# Patient Record
Sex: FEMALE | Race: BLACK OR AFRICAN AMERICAN | NOT HISPANIC OR LATINO | Employment: FULL TIME | URBAN - METROPOLITAN AREA
[De-identification: names, ages, dates, MRNs, and addresses within clinical notes are randomized per-mention and may not be internally consistent; named-entity substitution may affect disease eponyms.]

---

## 2020-02-07 ENCOUNTER — ROUTINE PRENATAL (OUTPATIENT)
Dept: OBSTETRICS AND GYNECOLOGY | Facility: CLINIC | Age: 25
End: 2020-02-07
Payer: COMMERCIAL

## 2020-02-07 VITALS — SYSTOLIC BLOOD PRESSURE: 128 MMHG | HEART RATE: 89 BPM | WEIGHT: 252.63 LBS | DIASTOLIC BLOOD PRESSURE: 72 MMHG

## 2020-02-07 DIAGNOSIS — Z34.01 ENCOUNTER FOR SUPERVISION OF NORMAL FIRST PREGNANCY IN FIRST TRIMESTER: Primary | ICD-10-CM

## 2020-02-07 DIAGNOSIS — O99.211 OBESITY AFFECTING PREGNANCY IN FIRST TRIMESTER: ICD-10-CM

## 2020-02-07 LAB
AMPHETAMINES (500): NEGATIVE NG/ML
BARBITURATES (200): NEGATIVE NG/ML
BENZODIAZEPINES: NEGATIVE NG/ML
COCAINE (150): NEGATIVE NG/ML
MARIJUANA, THC (50): NEGATIVE NG/ML
METHADONE: NEGATIVE NG/ML
OPIATES: NEGATIVE NG/ML
OXYCODONE: NEGATIVE NG/ML
PH: 6.9 (ref 4.5–8)
PHENCYCLIDINE (25): NEGATIVE NG/ML
URINE CREATININE D/S: 152.2 MG/DL

## 2020-02-07 PROCEDURE — 0500F PR INITIAL PRENATAL CARE VISIT: ICD-10-PCS | Mod: S$GLB,,, | Performed by: OBSTETRICS & GYNECOLOGY

## 2020-02-07 PROCEDURE — 90686 FLU VACCINE (QUAD) GREATER THAN OR EQUAL TO 3YO PRESERVATIVE FREE IM: ICD-10-PCS | Mod: S$GLB,,, | Performed by: OBSTETRICS & GYNECOLOGY

## 2020-02-07 PROCEDURE — 90686 IIV4 VACC NO PRSV 0.5 ML IM: CPT | Mod: S$GLB,,, | Performed by: OBSTETRICS & GYNECOLOGY

## 2020-02-07 PROCEDURE — 0500F INITIAL PRENATAL CARE VISIT: CPT | Mod: S$GLB,,, | Performed by: OBSTETRICS & GYNECOLOGY

## 2020-02-07 PROCEDURE — 90471 FLU VACCINE (QUAD) GREATER THAN OR EQUAL TO 3YO PRESERVATIVE FREE IM: ICD-10-PCS | Mod: S$GLB,,, | Performed by: OBSTETRICS & GYNECOLOGY

## 2020-02-07 PROCEDURE — 90471 IMMUNIZATION ADMIN: CPT | Mod: S$GLB,,, | Performed by: OBSTETRICS & GYNECOLOGY

## 2020-02-07 NOTE — PROGRESS NOTES
Subjective:       Patient ID: Robe Lopez is a 24 y.o.  at 8w5d   Chief Complaint:  Routine Prenatal Visit      History of Present Illness  here for new ob exam.  Labs and history were reviewed with the patient today  + nausea and vomiting daily       OB History  OB History    Para Term  AB Living   1 0       0   SAB TAB Ectopic Multiple Live Births                  # Outcome Date GA Lbr Kiel/2nd Weight Sex Delivery Anes PTL Lv   1 Current                Review of Systems  nml 1st trimester sx- sob, dec excercixe tolerance, fatigue and nausea  Neg for vag bleed, dc, vomiting, cp, lof, fever, chills, ns, visual changes, swelling, headaches, constipation/diarrhea, dysuria, freq/urgency of urination     Objective:     Vitals:    20 1100   BP: 128/72   Pulse: 89       NAD  NCAT  pupils normal size  Skin nml no rashes or lesions  No resp distress, resp even and unlabored  nt nd, no rebound no guarding  No cyanosis or clubbing, edema appropriate for pregn    Uterus size approp for gest age    Assessment:        1. Encounter for supervision of normal first pregnancy in first trimester               Plan:      Encounter for supervision of normal first pregnancy in first trimester  -     Drug Screen 8 Panel  -     Trichomonas Vaginalis, DESIREE  -     C. trachomatis/N. gonorrhoeae by AMP DNA [facility]; Urine  -     Hepatitis B surface antigen; Future; Expected date: 2020  -     Antibody screen; Future; Expected date: 2020  -     Sickle cell screen; Future; Expected date: 2020  -     Type & Screen; Future; Expected date: 2020  -     HIV 1/2 Ag/Ab (4th Gen); Future; Expected date: 2020  -     Treponema pallidum (syphillis) antibody; Future; Expected date: 2020  -     CBC auto differential; Future; Expected date: 2020  -     Rubella Antibody, IgG; Future; Expected date: 2020  -     Hemoglobin A1c; Future; Expected date: 2020  -     Basic metabolic  panel; Future; Expected date: 02/07/2020    Other orders  -     Influenza - Quadrivalent (PF)         unisom and B6 for nasuea   OB labs today   a1c BMP added   Needs early o'sul   Flu shot today   Pain fever bleeding precautions  Encouraged PNV  rtc 4 wks

## 2020-02-10 PROBLEM — Z67.91 RH NEGATIVE STATE IN ANTEPARTUM PERIOD: Status: ACTIVE | Noted: 2020-02-10

## 2020-02-10 PROBLEM — O26.899 RH NEGATIVE STATE IN ANTEPARTUM PERIOD: Status: ACTIVE | Noted: 2020-02-10

## 2020-02-10 LAB
ABS NRBC COUNT: 0 X 10 3/UL (ref 0–0.01)
ABSOLUTE BASOPHIL: 0.02 X 10 3/UL (ref 0–0.22)
ABSOLUTE EOSINOPHIL: 0.09 X 10 3/UL (ref 0.04–0.54)
ABSOLUTE IMMATURE GRAN: 0.02 X 10 3/UL (ref 0–0.04)
ABSOLUTE LYMPHOCYTE: 2.51 X 10 3/UL (ref 0.86–4.75)
ABSOLUTE MONOCYTE: 0.6 X 10 3/UL (ref 0.22–1.08)
ANION GAP SERPL CALC-SCNC: 17 MMOL/L (ref 8–17)
ANTIBODY SCREEN: NEGATIVE
BASOPHILS NFR BLD: 0.2 % (ref 0.2–1.2)
BLOOD GROUPING: NORMAL
BLOOD TYPE (D): NEGATIVE
BUN/CREAT SERPL: 8.8 (ref 6–20)
CALCIUM SERPL-MCNC: 9.4 MG/DL (ref 8.6–10.2)
CARBON DIOXIDE, CO2: 22 MMOL/L (ref 22–29)
CHLORIDE: 103 MMOL/L (ref 98–107)
CREAT SERPL-MCNC: 0.42 MG/DL (ref 0.5–0.9)
EOSINOPHIL NFR BLD: 1 % (ref 0.7–7)
ESTIMATED AVERAGE GLUCOSE: 107 MG/DL
GFR ESTIMATION: 183.84
GLUCOSE: 115 MG/DL (ref 74–106)
HBA1C MFR BLD: 5.4 % (ref 4–6)
HBV SURFACE AG SERPL QL IA: NONREACTIVE
HCT VFR BLD AUTO: 36.5 % (ref 37–47)
HGB BLD-MCNC: 11.8 G/DL (ref 12–16)
HIV 1+2 AB+HIV1 P24 AG SERPL QL IA: NONREACTIVE
IMMATURE GRANULOCYTES: 0.2 % (ref 0–0.5)
LYMPHOCYTES NFR BLD: 28.5 % (ref 19.3–53.1)
MCH RBC QN AUTO: 26 PG (ref 27–32)
MCHC RBC AUTO-ENTMCNC: 32.3 G/DL (ref 32–36)
MCV RBC AUTO: 80.6 FL (ref 82–100)
MONOCYTES NFR BLD: 6.8 % (ref 4.7–12.5)
NEUTROPHILS ABSOLUTE COUNT: 5.56 X 10 3/UL (ref 2.15–7.56)
NEUTROPHILS NFR BLD: 63.3 %
NUCLEATED RED BLOOD CELLS: 0 /100 WBC (ref 0–0.2)
PLATELET # BLD AUTO: 484 X 10 3/UL (ref 135–400)
POTASSIUM: 3.5 MMOL/L (ref 3.5–5.1)
RBC # BLD AUTO: 4.53 X 10 6/UL (ref 4.2–5.4)
RDW-SD: 40.9 FL (ref 37–54)
RUBELLA IGG SCREEN: NORMAL
SICKLE CELL PREP: NEGATIVE
SODIUM: 142 MMOL/L (ref 136–145)
SYPHILIS TREPONEMAL ANTIBODY: NONREACTIVE
UREA NITROGEN (BUN): 3.7 MG/DL (ref 6–20)
WBC # BLD: 8.8 X 10 3/UL (ref 4.3–10.8)

## 2020-02-11 LAB
CHLAMYDIA: NEGATIVE
GONORRHEA: NEGATIVE
SOURCE: NORMAL
SOURCE: NORMAL
TRICHOMONAS AMPLIFIED: NEGATIVE

## 2020-03-06 ENCOUNTER — ROUTINE PRENATAL (OUTPATIENT)
Dept: OBSTETRICS AND GYNECOLOGY | Facility: CLINIC | Age: 25
End: 2020-03-06
Payer: COMMERCIAL

## 2020-03-06 VITALS — HEART RATE: 101 BPM | DIASTOLIC BLOOD PRESSURE: 83 MMHG | SYSTOLIC BLOOD PRESSURE: 121 MMHG | WEIGHT: 259.63 LBS

## 2020-03-06 DIAGNOSIS — O26.899 RH NEGATIVE STATE IN ANTEPARTUM PERIOD: ICD-10-CM

## 2020-03-06 DIAGNOSIS — Z67.91 RH NEGATIVE STATE IN ANTEPARTUM PERIOD: ICD-10-CM

## 2020-03-06 DIAGNOSIS — O99.211 OBESITY AFFECTING PREGNANCY IN FIRST TRIMESTER: ICD-10-CM

## 2020-03-06 DIAGNOSIS — Z34.02 ENCOUNTER FOR SUPERVISION OF NORMAL FIRST PREGNANCY IN SECOND TRIMESTER: Primary | ICD-10-CM

## 2020-03-06 PROCEDURE — 0502F PR SUBSEQUENT PRENATAL CARE: ICD-10-PCS | Mod: CPTII,S$GLB,, | Performed by: OBSTETRICS & GYNECOLOGY

## 2020-03-06 PROCEDURE — 0502F SUBSEQUENT PRENATAL CARE: CPT | Mod: CPTII,S$GLB,, | Performed by: OBSTETRICS & GYNECOLOGY

## 2020-03-06 NOTE — PROGRESS NOTES
Subjective:       Patient ID: Robe Lopez is a 24 y.o.  at 12w5d     Chief Complaint:  Routine Prenatal Visit      History of Present Illness  No complaints. Labs and history reviewed with pt.         Review of Systems  Denies n/v, f/c, dysuria, contractions,   VD, VB, round ligament pain, headaches       Objective:     Vitals:    20 0914   BP: 121/83   Pulse: 101     Wt Readings from Last 3 Encounters:   20 117.8 kg (259 lb 9.6 oz)   20 114.6 kg (252 lb 9.6 oz)     nad  NCAT  pupils normal size  Skin nml no rashes or lesions  No resp distress, resp even and unlabored   nt, nd,  no rebound no guarding  No cyanosis or clubbing, edema appropriate for pregn  FHT: 140s     Assessment:        1. Rh negative state in antepartum period    2. Obesity affecting pregnancy in first trimester                Plan:      nipt today    Encouraged PNV  Pain, fever, bleeding precautions   RTC 4 weeks

## 2020-04-03 ENCOUNTER — OFFICE VISIT (OUTPATIENT)
Dept: OBSTETRICS AND GYNECOLOGY | Facility: CLINIC | Age: 25
End: 2020-04-03
Payer: COMMERCIAL

## 2020-04-03 DIAGNOSIS — O99.212 OBESITY AFFECTING PREGNANCY IN SECOND TRIMESTER: Primary | ICD-10-CM

## 2020-04-03 DIAGNOSIS — O26.899 RH NEGATIVE STATE IN ANTEPARTUM PERIOD: ICD-10-CM

## 2020-04-03 DIAGNOSIS — Z67.91 RH NEGATIVE STATE IN ANTEPARTUM PERIOD: ICD-10-CM

## 2020-04-03 DIAGNOSIS — O99.211 OBESITY AFFECTING PREGNANCY IN FIRST TRIMESTER: ICD-10-CM

## 2020-04-03 PROCEDURE — 0502F PR SUBSEQUENT PRENATAL CARE: ICD-10-PCS | Mod: CPTII,,, | Performed by: OBSTETRICS & GYNECOLOGY

## 2020-04-03 PROCEDURE — 0502F SUBSEQUENT PRENATAL CARE: CPT | Mod: CPTII,,, | Performed by: OBSTETRICS & GYNECOLOGY

## 2020-04-03 NOTE — PROGRESS NOTES
The patient location is: home   The chief complaint leading to consultation is: f/u ob visit during covid   Visit type: Virtual visit with synchronous audio and video  Total time spent with patient: 15 mins   Each patient to whom he or she provides medical services by telemedicine is:  (1) informed of the relationship between the physician and patient and the respective role of any other health care provider with respect to management of the patient; and (2) notified that he or she may decline to receive medical services by telemedicine and may withdraw from such care at any time.    Notes:     Subjective:       Patient ID: Robe Lopez is a 24 y.o.  at 12w5d     Chief Complaint:  No chief complaint on file.      History of Present Illness  No complaints. Labs and history reviewed with pt.         Review of Systems  Denies n/v, f/c, dysuria, contractions,   VD, VB, round ligament pain, headaches       Objective:     There were no vitals filed for this visit. due to telemed   Wt Readings from Last 3 Encounters:   20 117.8 kg (259 lb 9.6 oz)   20 114.6 kg (252 lb 9.6 oz)     deferred due to telemed     Assessment:        1. Rh negative state in antepartum period    2. Obesity affecting pregnancy in first trimester                Plan:      afp today   nipt neg   Encouraged PNV  Pain, fever, bleeding precautions   RTC 4 weeks         Greater than 50% of time was spent counseling patient

## 2020-04-06 LAB
AFP MOM: 1.13
AFP, SERUM: 33.2 NG/ML
CALC'D GESTATIONAL AGE: 16.7 WEEKS
COMMENT: NORMAL
DONOR AGE: EGG RETRIEVAL: NORMAL
DONOR EGG: NO
EDD DETERMINED BY: NORMAL
EST'D DATE OF DELIVERY: NORMAL
HX OF NEURAL TUBE DEFECTS: NO
INSULIN DEPEND DIABETIC: NO
INTERPRETATION: NORMAL
Lab: NORMAL
MATERNAL WEIGHT: 260 LBS
MOTHER'S ETHNIC ORIGIN: NORMAL
NUMBER OF FETUSES: NORMAL
PREV PREGNANCY DOWN SYND: NO
REPEAT SPECIMEN: NO
RISK FOR ONTD: NORMAL

## 2020-04-20 DIAGNOSIS — O99.282 HYPERTHYROIDISM AFFECTING PREGNANCY IN SECOND TRIMESTER: Primary | ICD-10-CM

## 2020-04-20 DIAGNOSIS — E05.90 HYPERTHYROIDISM AFFECTING PREGNANCY IN SECOND TRIMESTER: Primary | ICD-10-CM

## 2020-04-20 RX ORDER — METHIMAZOLE 5 MG/1
5 TABLET ORAL 3 TIMES DAILY
Qty: 90 TABLET | Refills: 11 | Status: SHIPPED | OUTPATIENT
Start: 2020-04-20 | End: 2020-09-28

## 2020-04-23 ENCOUNTER — ROUTINE PRENATAL (OUTPATIENT)
Dept: OBSTETRICS AND GYNECOLOGY | Facility: CLINIC | Age: 25
End: 2020-04-23
Payer: COMMERCIAL

## 2020-04-23 VITALS — DIASTOLIC BLOOD PRESSURE: 85 MMHG | SYSTOLIC BLOOD PRESSURE: 144 MMHG | HEART RATE: 110 BPM | WEIGHT: 260 LBS

## 2020-04-23 DIAGNOSIS — O26.899 RH NEGATIVE STATE IN ANTEPARTUM PERIOD: ICD-10-CM

## 2020-04-23 DIAGNOSIS — O10.912 PREEXISTING HYPERTENSION COMPLICATING PREGNANCY, ANTEPARTUM, SECOND TRIMESTER: ICD-10-CM

## 2020-04-23 DIAGNOSIS — O99.211 OBESITY AFFECTING PREGNANCY IN FIRST TRIMESTER: ICD-10-CM

## 2020-04-23 DIAGNOSIS — E05.90 HYPERTHYROIDISM AFFECTING PREGNANCY IN SECOND TRIMESTER: Primary | ICD-10-CM

## 2020-04-23 DIAGNOSIS — O99.282 HYPERTHYROIDISM AFFECTING PREGNANCY IN SECOND TRIMESTER: Primary | ICD-10-CM

## 2020-04-23 DIAGNOSIS — Z67.91 RH NEGATIVE STATE IN ANTEPARTUM PERIOD: ICD-10-CM

## 2020-04-23 PROCEDURE — 0502F PR SUBSEQUENT PRENATAL CARE: ICD-10-PCS | Mod: CPTII,S$GLB,, | Performed by: OBSTETRICS & GYNECOLOGY

## 2020-04-23 PROCEDURE — 0502F SUBSEQUENT PRENATAL CARE: CPT | Mod: CPTII,S$GLB,, | Performed by: OBSTETRICS & GYNECOLOGY

## 2020-04-23 NOTE — PROGRESS NOTES
Subjective:       Patient ID: Robe Lopez is a 24 y.o.  at 19w4d     Chief Complaint:  Routine Prenatal Visit      History of Present Illness  Patient was recently admitted to Labor and delivery secondary to tachycardia and elevated thyroid labs.  Patient is found to have new onset hyperthyroidism.  Patient states she feels as her though her heart is racing sometimes is worse with movement.  Patient also states that she is running high blood pressure at home and highest has been 160/100.      Review of Systems  Denies n/v, f/c, dysuria, contractions,   VD, VB, round ligament pain, headaches       Objective:     Vitals:    20 1014   BP: (!) 144/85   Pulse: 110     Wt Readings from Last 3 Encounters:   20 117.9 kg (260 lb)   20 117.8 kg (259 lb 9.6 oz)   20 114.6 kg (252 lb 9.6 oz)     nad  NCAT  pupils normal size  Skin nml no rashes or lesions  No resp distress, resp even and unlabored   nt, nd,  no rebound no guarding  No cyanosis or clubbing, edema appropriate for pregn  FHT: 140s     Assessment:        1. Hyperthyroidism affecting pregnancy in second trimester    2. Rh negative state in antepartum period    3. Obesity affecting pregnancy in first trimester    4. Preexisting hypertension complicating pregnancy, antepartum, second trimester                Plan:      Started methimazole 5 mg t.i.d.  TRAB ordered  M appointment made  Patient encouraged to call if her heart rate becomes elevated above 130  Start beta-blocker if needed  Encouraged PNV  Pain, fever, bleeding precautions   RTC 2 weeks

## 2020-05-04 DIAGNOSIS — E05.90 HYPERTHYROIDISM IN PREGNANCY, ANTEPARTUM, SECOND TRIMESTER: Primary | ICD-10-CM

## 2020-05-04 DIAGNOSIS — O99.282 HYPERTHYROIDISM IN PREGNANCY, ANTEPARTUM, SECOND TRIMESTER: Primary | ICD-10-CM

## 2020-05-07 ENCOUNTER — INITIAL CONSULT (OUTPATIENT)
Dept: MATERNAL FETAL MEDICINE | Facility: CLINIC | Age: 25
End: 2020-05-07
Payer: COMMERCIAL

## 2020-05-07 VITALS
HEART RATE: 128 BPM | HEIGHT: 61 IN | BODY MASS INDEX: 50.03 KG/M2 | DIASTOLIC BLOOD PRESSURE: 76 MMHG | OXYGEN SATURATION: 97 % | WEIGHT: 265 LBS | RESPIRATION RATE: 20 BRPM | SYSTOLIC BLOOD PRESSURE: 136 MMHG

## 2020-05-07 DIAGNOSIS — O99.282 HYPERTHYROIDISM IN PREGNANCY, ANTEPARTUM, SECOND TRIMESTER: ICD-10-CM

## 2020-05-07 DIAGNOSIS — E05.90 HYPERTHYROIDISM IN PREGNANCY, ANTEPARTUM, SECOND TRIMESTER: ICD-10-CM

## 2020-05-07 PROCEDURE — 3008F PR BODY MASS INDEX (BMI) DOCUMENTED: ICD-10-PCS | Mod: CPTII,S$GLB,, | Performed by: OBSTETRICS & GYNECOLOGY

## 2020-05-07 PROCEDURE — 3008F BODY MASS INDEX DOCD: CPT | Mod: CPTII,S$GLB,, | Performed by: OBSTETRICS & GYNECOLOGY

## 2020-05-07 PROCEDURE — 76811 PR US, OB FETAL EVAL & EXAM, TRANSABDOM,FIRST GESTATION: ICD-10-PCS | Mod: S$GLB,,, | Performed by: OBSTETRICS & GYNECOLOGY

## 2020-05-07 PROCEDURE — 99203 PR OFFICE/OUTPT VISIT, NEW, LEVL III, 30-44 MIN: ICD-10-PCS | Mod: 25,S$GLB,, | Performed by: OBSTETRICS & GYNECOLOGY

## 2020-05-07 PROCEDURE — 76811 OB US DETAILED SNGL FETUS: CPT | Mod: S$GLB,,, | Performed by: OBSTETRICS & GYNECOLOGY

## 2020-05-07 PROCEDURE — 99203 OFFICE O/P NEW LOW 30 MIN: CPT | Mod: 25,S$GLB,, | Performed by: OBSTETRICS & GYNECOLOGY

## 2020-05-07 NOTE — PROGRESS NOTES
Indication for consultation:  Thyroid dysfunction in pregnancy  Maternal obesity    Provider requesting consultation:  Dr. Villareal    Dear Dr. Villareal,    I had the pleasure of seeing Robe Lopez for initial consultation today.  As you recall she is a 24 y.o.  at 21w4d here for consultation regarding recent laboratory evaluation noting hyperthyroidism.    According to the patient she presented to the emergency room/assessment area after complaint of palpitations around her about 19 weeks.  During that evaluation per her report she was noted to be hyperthyroid.  She is currently been started on methimazole 5 mg 3 times per day and has been taking this for approximately 2 weeks.  She denies any excessive sweating, heat intolerance or diarrhea.    PMH:  She has a known history of polycystic ovarian syndrome.  She is currently on no medication for this disorder.  She also has a prior history of endometriosis.  She denies any history of hypertension.  She denies any history of diabetes.  She is morbidly obese with a weight of 265 lb and a height of 5 ft 1 in.    Ob Hx:  This is the patient's 1st pregnancy.  This is a spontaneous pregnancy.    PSH:  The patient has previously had a dilation and curettage for abnormal uterine bleeding.  This is occurred on 3 different occasions.  She also has a prior history of a left salpingo oophorectomy.    SOC:  She denies any tobacco, alcohol or recreational drug use in pregnancy.  Prior to pregnancy she was a tobacco user.    Medications:  Methimazole 5 mg 3 times per day.  She has been taking this medication for approximately 2 weeks.    Family hx:  She has a family history of Graves disease of her mother.  She denies any other genetic abnormalities.  She denies any other congenital anomalies.    Allergies:  No known drug allergies    Review of systems: The patient denies any vaginal bleeding, loss of fluid or contraction pain today.  Vitals:    20 1132   BP: 136/76    Pulse: (!) 128   Resp: 20       Physical exam:  Gen: WD obese in NAD  HEENT: WNL.  No proptosis  Abdomen: Soft, non-tender on ultrasonographers evaluation  Skin: No rash or jaundice  Extremities: No clubbing or cyanosis  Neuro: Grossly intact    Ultrasound:  A detailed fetal anatomical survey was performed today.  There is a single intrauterine pregnancy in the cephalic presentation.  Estimated fetal weight is 451 g which is the 42nd percentile for this gestational age.  Structurally there does not appear to be any major fetal abnormalities however the fetal spine is suboptimal today.  The placenta is fundal with no evidence of previa.  The umbilical cord 3 is vessels.  Amniotic fluid volume is normal.  The cervix appears to be a normally transabdominally.  Please see our official report for full specifics.    Recommendations:  Today I discussed with the patient the concept of thyroid dysfunction in pregnancy.  I reviewed with her the diagnosis of hyperthyroidism versus hypothyroidism.  Interestingly outside of her tachycardia she does not show any other evidence hyperthyroidism.  As a matter of fact her morbid obesity argues against her having hyperthyroidism and more likely hypothyroidism.  Try as I may I could not find the results of her thyroid function tests that were completed in the emergency setting.  I did note that she has thyroid-stimulating antibodies currently pending.  What I would suggest for now is to continue her on the methimazole as already started and to repeat her thyroid function test in approximately 3 weeks from today.  At that time I would recommend to check her TSH, her free T4, her total T4 and her total T3.  If not already completed by that time I would also suggest checking her thyroid stimulating receptor antibodies and thyroid peroxidase antibodies.  We will see her approximately 1 week later to determine whether not she needs to continue her methimazole and if adjustments are  needed.  If upon return it is determined that she does not require medication then I suspect we need to go looking for other sources of her tachycardia.  Her biggest risk factor of courses her morbid obesity.  If her tachycardia is persistent you may want to consider a echocardiogram make ensure there is no underlying cardiac dysfunction that may cause maternal morbidity and mortality at delivery.    Thanks once again for allowing us to participate in the care of your patients.  If you have any questions about today's consultation feel free to contact me or my partners at [phone].    Sincerely,

## 2020-05-07 NOTE — PROGRESS NOTES
"Rboe is here for initial [facility] consultation, referred by Dr. Villareal for newly diagnosed hyperthyroidism.    She is feeling fetal movement.    Robe denies vaginal bleeding, loss of fluid, recurrent cramping.    She states that she did not take her Methimazole yet today, denies any chest discomfort with noted tachycardia.      Vitals:    05/07/20 1132   BP: 136/76   Pulse: (!) 128   Resp: 20   SpO2: 97%   Weight: 120.2 kg (265 lb)   Height: 5' 1" (1.549 m)      BMI:                    50.07 kg/m^2             "

## 2020-05-21 ENCOUNTER — ROUTINE PRENATAL (OUTPATIENT)
Dept: OBSTETRICS AND GYNECOLOGY | Facility: CLINIC | Age: 25
End: 2020-05-21
Payer: COMMERCIAL

## 2020-05-21 ENCOUNTER — TELEPHONE (OUTPATIENT)
Dept: OBSTETRICS AND GYNECOLOGY | Facility: CLINIC | Age: 25
End: 2020-05-21

## 2020-05-21 VITALS
HEART RATE: 103 BPM | DIASTOLIC BLOOD PRESSURE: 73 MMHG | WEIGHT: 269.81 LBS | SYSTOLIC BLOOD PRESSURE: 125 MMHG | BODY MASS INDEX: 50.98 KG/M2

## 2020-05-21 DIAGNOSIS — O26.899 RH NEGATIVE STATE IN ANTEPARTUM PERIOD: ICD-10-CM

## 2020-05-21 DIAGNOSIS — O99.211 OBESITY AFFECTING PREGNANCY IN FIRST TRIMESTER: ICD-10-CM

## 2020-05-21 DIAGNOSIS — O10.912 PREEXISTING HYPERTENSION COMPLICATING PREGNANCY, ANTEPARTUM, SECOND TRIMESTER: ICD-10-CM

## 2020-05-21 DIAGNOSIS — Z67.91 RH NEGATIVE STATE IN ANTEPARTUM PERIOD: ICD-10-CM

## 2020-05-21 LAB
ALBUMIN SERPL-MCNC: 3.7 G/DL (ref 3.5–5.2)
ALBUMIN/GLOB SERPL ELPH: 1.2 {RATIO} (ref 1–2.7)
ALP ISOS SERPL LEV INH-CCNC: 79 U/L (ref 35–105)
ALT (SGPT): 11 U/L (ref 0–33)
ANION GAP SERPL CALC-SCNC: 13 MMOL/L (ref 8–17)
AST SERPL-CCNC: 11 U/L (ref 0–32)
BILIRUBIN, TOTAL: 0.2 MG/DL (ref 0–1.2)
BUN/CREAT SERPL: 10.6 (ref 6–20)
CALCIUM SERPL-MCNC: 9.4 MG/DL (ref 8.6–10.2)
CARBON DIOXIDE, CO2: 21 MMOL/L (ref 22–29)
CHLORIDE: 102 MMOL/L (ref 98–107)
CREAT SERPL-MCNC: 0.36 MG/DL (ref 0.5–0.9)
GFR ESTIMATION: 219.63
GLOBULIN: 3.2 G/DL (ref 1.5–4.5)
GLUCOSE: 119 MG/DL (ref 74–106)
MAGNESIUM SERPL-MCNC: 1.58 MG/DL (ref 1.6–2.4)
POTASSIUM: 3.6 MMOL/L (ref 3.5–5.1)
PROT SNV-MCNC: 6.9 G/DL (ref 6.4–8.3)
SODIUM: 136 MMOL/L (ref 136–145)
T3 SERPL-MCNC: 2.55 NG/ML (ref 0.8–2)
T4: 15 UG/DL (ref 4.5–11.7)
TSH SERPL DL<=0.005 MIU/L-ACNC: 0.01 UIU/ML (ref 0.27–4.2)
UREA NITROGEN (BUN): 3.8 MG/DL (ref 6–20)

## 2020-05-21 PROCEDURE — 0502F SUBSEQUENT PRENATAL CARE: CPT | Mod: CPTII,S$GLB,, | Performed by: OBSTETRICS & GYNECOLOGY

## 2020-05-21 PROCEDURE — 0502F PR SUBSEQUENT PRENATAL CARE: ICD-10-PCS | Mod: CPTII,S$GLB,, | Performed by: OBSTETRICS & GYNECOLOGY

## 2020-05-21 NOTE — TELEPHONE ENCOUNTER
----- Message from Gino Moyer LPN sent at 5/21/2020  5:12 PM CDT -----  Contact: Janet Wahl/mom [phone]      ----- Message -----  From: Radha Falk  Sent: 5/21/2020   3:14 PM CDT  To: [facility] Staff    States that she is calling to schedule a new ob appt with Dr Rust. States that pt is wanting to change from Dr Yuli Villareal to Dr Rust. States that pt is having some complications. Please call back at [phone]//thank you acc

## 2020-05-22 ENCOUNTER — TELEPHONE (OUTPATIENT)
Dept: OBSTETRICS AND GYNECOLOGY | Facility: CLINIC | Age: 25
End: 2020-05-22

## 2020-05-22 NOTE — PROGRESS NOTES
Subjective:       Patient ID: Robe Lopez is a 24 y.o.  at 19w4d     Chief Complaint:  Routine Prenatal Visit      History of Present Illness  Patient has no new complaints today. Still having       Review of Systems  Denies n/v, f/c, dysuria, contractions,   VD, VB, round ligament pain, headaches       Objective:     Vitals:    20 1321   BP: 125/73   Pulse: 103     Wt Readings from Last 3 Encounters:   20 122.4 kg (269 lb 12.8 oz)   20 120.2 kg (265 lb)   20 117.9 kg (260 lb)     nad  NCAT  pupils normal size  Skin nml no rashes or lesions  No resp distress, resp even and unlabored   nt, nd,  no rebound no guarding  No cyanosis or clubbing, edema appropriate for pregn  FHT: 140s     Assessment:        1. Preexisting hypertension complicating pregnancy, antepartum, second trimester    2. Rh negative state in antepartum period    3. Obesity affecting pregnancy in first trimester                Plan:      continue methimazole 5 mg t.i.d.  MFM following   TSH, T4, T3, TRAB, TPO   Encouraged PNV  Pain, fever, bleeding precautions   RTC 2 weeks

## 2020-05-22 NOTE — TELEPHONE ENCOUNTER
PATIENT IS REQUESTING AN OB APPOINTMENT  SHE WOULD LIKE TO TRANSFER PRENATAL CARE FROM DR TAYLOR  LMP  09/13/2019  INSURANCE WITH [employer]

## 2020-05-22 NOTE — TELEPHONE ENCOUNTER
----- Message from Nancy Hope sent at 5/22/2020  9:15 AM CDT -----  Contact: anya Moss is calling to schedule an jared for her daughter who is 6 mos pregnant . Pt is formerly seeing Dr. Villareal .please call back at [phone]- or [phone]. Thanks .

## 2020-05-25 ENCOUNTER — TELEPHONE (OUTPATIENT)
Dept: OBSTETRICS AND GYNECOLOGY | Facility: CLINIC | Age: 25
End: 2020-05-25

## 2020-05-25 DIAGNOSIS — O26.899 SHORTNESS OF BREATH DUE TO PREGNANCY: ICD-10-CM

## 2020-05-25 DIAGNOSIS — O99.282 HYPERTHYROIDISM AFFECTING PREGNANCY IN SECOND TRIMESTER: ICD-10-CM

## 2020-05-25 DIAGNOSIS — R06.02 SHORTNESS OF BREATH DUE TO PREGNANCY: ICD-10-CM

## 2020-05-25 DIAGNOSIS — O99.211 OBESITY AFFECTING PREGNANCY IN FIRST TRIMESTER: Primary | ICD-10-CM

## 2020-05-25 DIAGNOSIS — E05.90 HYPERTHYROIDISM AFFECTING PREGNANCY IN SECOND TRIMESTER: ICD-10-CM

## 2020-05-25 DIAGNOSIS — O10.912 PREEXISTING HYPERTENSION COMPLICATING PREGNANCY, ANTEPARTUM, SECOND TRIMESTER: ICD-10-CM

## 2020-05-25 RX ORDER — ALBUTEROL SULFATE 90 UG/1
2 AEROSOL, METERED RESPIRATORY (INHALATION) EVERY 6 HOURS PRN
Qty: 18 G | Refills: 0 | Status: SHIPPED | OUTPATIENT
Start: 2020-05-25 | End: 2020-09-28

## 2020-05-25 NOTE — TELEPHONE ENCOUNTER
Discussed lab results with pt this am. TSH still low. Has MFM appt 6/3/20. Is still taking methimazole TID.     SOB has not improved. Will call out albuterol inhaler   Pt given strict precautions for elevated HR   Elevated glucose noted on BMP - O'sul order sent to lab   Pt encouraged to complete before MFM appt  Mag level low. May be causing muscle aches pt has been c/o. Encouraged OTC mag replacement

## 2020-05-27 LAB
GLUCOSE 1 HR POST 50 GM: 218 MG/DL
THYROPEROXIDASE AB SERPL-ACNC: 1 IU/ML
TRAB: 2.35 IU/L

## 2020-05-28 ENCOUNTER — CLINICAL SUPPORT (OUTPATIENT)
Dept: OBSTETRICS AND GYNECOLOGY | Facility: CLINIC | Age: 25
End: 2020-05-28
Payer: COMMERCIAL

## 2020-05-28 VITALS
SYSTOLIC BLOOD PRESSURE: 141 MMHG | BODY MASS INDEX: 50.07 KG/M2 | WEIGHT: 265 LBS | HEART RATE: 123 BPM | DIASTOLIC BLOOD PRESSURE: 86 MMHG

## 2020-05-28 DIAGNOSIS — Z67.91 RH NEGATIVE STATE IN ANTEPARTUM PERIOD: ICD-10-CM

## 2020-05-28 DIAGNOSIS — Z3A.24 24 WEEKS GESTATION OF PREGNANCY: Primary | ICD-10-CM

## 2020-05-28 DIAGNOSIS — O26.899 RH NEGATIVE STATE IN ANTEPARTUM PERIOD: ICD-10-CM

## 2020-05-28 DIAGNOSIS — O10.912 PREEXISTING HYPERTENSION COMPLICATING PREGNANCY, ANTEPARTUM, SECOND TRIMESTER: Primary | ICD-10-CM

## 2020-05-28 DIAGNOSIS — O10.912 PREEXISTING HYPERTENSION COMPLICATING PREGNANCY, ANTEPARTUM, SECOND TRIMESTER: ICD-10-CM

## 2020-05-28 DIAGNOSIS — O99.211 OBESITY AFFECTING PREGNANCY IN FIRST TRIMESTER: ICD-10-CM

## 2020-05-28 RX ORDER — MAGNESIUM 200 MG
TABLET ORAL DAILY
COMMUNITY
End: 2020-09-28

## 2020-05-28 RX ORDER — ASPIRIN 81 MG/1
81 TABLET ORAL DAILY
COMMUNITY
End: 2022-06-16

## 2020-05-28 NOTE — TELEPHONE ENCOUNTER
Dr AVELAR WOULD LIKE PATIENT TO START TAKING LABETALOL 200 MG BID AND   REFER TO CARDIOLOGY  PATIENT DOES NOT HAVE A VOICEMAIL SETUP TO LEAVE A MESSAGE

## 2020-05-28 NOTE — PROGRESS NOTES
TRANSFER OF PRENATAL CARE AT 24 WEEKS FROM DR TAYLOR, HIGH RISK: HYPERTENSION, THYROID DS POORLY CONTROLLED, DM NEWLY DIAGNOSED,TACHYCARDIA, OBESITY  START BABY ASPIRIN 81 MG, REFER TO DM ED, MFM APPT SCHEDULED 06/04/2020

## 2020-05-29 ENCOUNTER — PATIENT MESSAGE (OUTPATIENT)
Dept: OBSTETRICS AND GYNECOLOGY | Facility: CLINIC | Age: 25
End: 2020-05-29

## 2020-05-29 RX ORDER — LABETALOL 200 MG/1
200 TABLET, FILM COATED ORAL 2 TIMES DAILY
Qty: 60 TABLET | Refills: 11 | Status: SHIPPED | OUTPATIENT
Start: 2020-05-29 | End: 2020-09-28

## 2020-06-02 DIAGNOSIS — O99.282 HYPERTHYROIDISM IN PREGNANCY, ANTEPARTUM, SECOND TRIMESTER: Primary | ICD-10-CM

## 2020-06-02 DIAGNOSIS — E05.90 HYPERTHYROIDISM IN PREGNANCY, ANTEPARTUM, SECOND TRIMESTER: Primary | ICD-10-CM

## 2020-06-04 ENCOUNTER — PROCEDURE VISIT (OUTPATIENT)
Dept: MATERNAL FETAL MEDICINE | Facility: CLINIC | Age: 25
End: 2020-06-04
Payer: COMMERCIAL

## 2020-06-04 VITALS
OXYGEN SATURATION: 98 % | BODY MASS INDEX: 50.41 KG/M2 | SYSTOLIC BLOOD PRESSURE: 140 MMHG | HEIGHT: 61 IN | WEIGHT: 267 LBS | DIASTOLIC BLOOD PRESSURE: 78 MMHG | HEART RATE: 121 BPM | RESPIRATION RATE: 20 BRPM

## 2020-06-04 DIAGNOSIS — E05.90 HYPERTHYROIDISM IN PREGNANCY, ANTEPARTUM, SECOND TRIMESTER: ICD-10-CM

## 2020-06-04 DIAGNOSIS — O99.282 HYPERTHYROIDISM IN PREGNANCY, ANTEPARTUM, SECOND TRIMESTER: ICD-10-CM

## 2020-06-04 PROCEDURE — 99213 PR OFFICE/OUTPT VISIT, EST, LEVL III, 20-29 MIN: ICD-10-PCS | Mod: 25,S$GLB,, | Performed by: OBSTETRICS & GYNECOLOGY

## 2020-06-04 PROCEDURE — 99213 OFFICE O/P EST LOW 20 MIN: CPT | Mod: 25,S$GLB,, | Performed by: OBSTETRICS & GYNECOLOGY

## 2020-06-04 PROCEDURE — 76816 OB US FOLLOW-UP PER FETUS: CPT | Mod: S$GLB,,, | Performed by: OBSTETRICS & GYNECOLOGY

## 2020-06-04 PROCEDURE — 76816 PR  US,PREGNANT UTERUS,F/U,TRANSABD APP: ICD-10-PCS | Mod: S$GLB,,, | Performed by: OBSTETRICS & GYNECOLOGY

## 2020-06-04 NOTE — PROGRESS NOTES
"Robe is here for followup [facility] consultation for hyperthyroidism, referred by Dr. Villareal, but now she sees Dr. Ramon Rust.    He started her on Labetalol 200 mg BID and ASA 81 mg daily last week. She presented to the ED on 5/30, was diagnosed with thyroid storm and admitted. Upon discharge her Methimazole was increased from 5 mg TID to 5 mg QID.    Robe also uses her Albuterol inhaler about 3 times per day.    She was recently diagnosed with gestational diabetes and just had her diabetic education yesterday.    She is feeling fetal movement.    Robe denies vaginal bleeding, loss of fluid, recurrent contractions, palpitations, headaches, visual or epigastric complaints.    Vitals:    06/04/20 1122   BP: (!) 140/78   Pulse: (!) 121   Resp: 20   SpO2: 98%   Weight: 121.1 kg (267 lb)   Height: 5' 1" (1.549 m)     BMI:                    50.45 kg/m^2   "

## 2020-06-04 NOTE — PROGRESS NOTES
Follow-up MFM Consultation  Referring provider: Dr. Swapnil Rust    Indications for referral:  1) Pregnancy at 25 weeks and 4 days gestation (EDC 9-13-20)  2) Hyperthyroidism  3) Gestational Diabetes Mellitus  4) Possible gestational hypertension    Dear Dr. Rust,  Thank you for your kind request for consultation and imaging of your patient  at [facility]. She returns today for hyperthyroidism.  She was in the hospital last week due to thyroid storm.  I don't have access to her labs from that time.  Her Methimazole was increased from 5mg daily to qid.  She was diagnosed with GDM recently as she just saw the diabetic educator yesterday.    Physical Exam    140/78, 121  General: Age appearing female in no apparent distress.  HEENT:  Normal external facial features. No scleral icterus.  ABDOMEN:  Obese, gravid, soft, nontender  MENTAL STATUS: No focal deficits.    ULTRASOUND FINDINGS:  A repeat ultrasound was performed. The fetus is cephalic. EFW of 907 grams is at the 57th percentile.  The placenta is posterior.  Amniotic fluid is normal. There are no fetal structural malformations to extent of view.    IMPRESSION:  25 week gestation complicated by uncontrolled hyperthyroidism and GDM with a normal ultrasound.    RECOMMENDATIONS/DISCUSSION:  1) I reviewed her blood glucose log and recommended that she start medical management.  I provided her a prescription for Metformin 500mg 1 qhs.  2) You should review her blood sugars on a weekly basis (or this can be done by our clinic if desired).  3) Goal Free T4 for pregnant patients with hyperthyroidism is the upper limit of normal regardless of TSH.  This should be redrawn in your office around June 30th (one month from medication change).  4) Given that she has had thyroid storm twice this pregnancy, she needs to see an endocrinologist as soon as possible.  Please refer.  If she presents with thyroid storm again,  please keep in mind that fetal well being will mirror that of maternal well being until the condition is controlled.  5) She should continue serial ultrasounds for growth, the next has been scheduled in our office.  6)  testing should be initiated in your office once 32 weeks.  7) Recommendations for timing of delivery will be made later in the pregnancy.    Thank you for allowing us to participate in her care.  Please do not hesitate to call with questions.

## 2020-06-10 ENCOUNTER — TELEPHONE (OUTPATIENT)
Dept: OBSTETRICS AND GYNECOLOGY | Facility: CLINIC | Age: 25
End: 2020-06-10

## 2020-06-10 NOTE — TELEPHONE ENCOUNTER
I left a voicemail requesting a return call  I want to follow up on a few issues:  DM education   How is glucose monitoring, results?  MFM consult  Cardiology consult

## 2020-06-10 NOTE — TELEPHONE ENCOUNTER
PATIENT MET WITH DM ED AND MFM  SHE HAS STARTED TAKING METFORMIN 500 MG DAILY  I REQUEST SHE FAX ME HER GLUCOSE LOG SO WE MAY REVIEW HER GLUCOSE READINGS  PATIENT VERBALIZES UNDERSTANDING  FAX # GIVEN TO PATIENT

## 2020-06-10 NOTE — TELEPHONE ENCOUNTER
----- Message from Cindy Puente sent at 6/10/2020 10:40 AM CDT -----  Contact: PT  Missed a call please call back [phone]

## 2020-06-16 DIAGNOSIS — O24.419 GESTATIONAL DIABETES MELLITUS (GDM) IN THIRD TRIMESTER, GESTATIONAL DIABETES METHOD OF CONTROL UNSPECIFIED: Primary | ICD-10-CM

## 2020-06-16 NOTE — TELEPHONE ENCOUNTER
----- Message from Malathi Rain sent at 6/16/2020  9:11 AM CDT -----  Contact: pt  Patient need to refill test strips for contour next meter. Pharmacy [facility]. Patient call back at [phone].

## 2020-06-16 NOTE — TELEPHONE ENCOUNTER
Patient requesting refill of contour next strips. Allergies reviewed. Medication pending. Pharmacy up to date. Please approve.

## 2020-06-18 ENCOUNTER — INITIAL PRENATAL (OUTPATIENT)
Dept: OBSTETRICS AND GYNECOLOGY | Facility: CLINIC | Age: 25
End: 2020-06-18
Payer: COMMERCIAL

## 2020-06-18 ENCOUNTER — TELEPHONE (OUTPATIENT)
Dept: OBSTETRICS AND GYNECOLOGY | Facility: CLINIC | Age: 25
End: 2020-06-18

## 2020-06-18 VITALS
WEIGHT: 268 LBS | BODY MASS INDEX: 50.64 KG/M2 | HEART RATE: 122 BPM | DIASTOLIC BLOOD PRESSURE: 81 MMHG | SYSTOLIC BLOOD PRESSURE: 129 MMHG

## 2020-06-18 DIAGNOSIS — Z34.93 THIRD TRIMESTER PREGNANCY: ICD-10-CM

## 2020-06-18 DIAGNOSIS — R39.15 URGENCY OF URINATION: Primary | ICD-10-CM

## 2020-06-18 DIAGNOSIS — O24.419 GESTATIONAL DIABETES MELLITUS (GDM) IN THIRD TRIMESTER, GESTATIONAL DIABETES METHOD OF CONTROL UNSPECIFIED: ICD-10-CM

## 2020-06-18 PROCEDURE — 0500F INITIAL PRENATAL CARE VISIT: CPT | Mod: S$GLB,,, | Performed by: OBSTETRICS & GYNECOLOGY

## 2020-06-18 PROCEDURE — 0500F PR INITIAL PRENATAL CARE VISIT: ICD-10-PCS | Mod: S$GLB,,, | Performed by: OBSTETRICS & GYNECOLOGY

## 2020-06-18 NOTE — TELEPHONE ENCOUNTER
----- Message from Radha Falk sent at 6/17/2020  9:11 AM CDT -----  Regarding: Pharm Auth  Contact: Grace/ [facility]  Grace/[facility] is calling to check status on Auth form for breast pump. Please fax back to [phone]. Please call back at [phone]//thank you acc

## 2020-06-18 NOTE — PROGRESS NOTES
24 y.o. female  at 27w4d   Reports + FM, denies VB, leaking of fluid, or cramping  Doing well but some cramping    TWbs   Reviewed upcoming 28wk labs, (2) and orders placed, tdap handout provided and explained  Reviewed warning signs, normal FM,  labor precautions and how/when to call.  RTC x2 wks, call or present sooner prn. She has been taking only 250 metformin q day    I have asked her to start 500 at night.

## 2020-06-25 ENCOUNTER — TELEPHONE (OUTPATIENT)
Dept: OBSTETRICS AND GYNECOLOGY | Facility: CLINIC | Age: 25
End: 2020-06-25

## 2020-06-25 NOTE — TELEPHONE ENCOUNTER
----- Message from Sabrina Franks MA sent at 6/25/2020  2:06 PM CDT -----  Regarding: FW: Breast pump authorization  Contact: Grace eddy/[facility]    ----- Message -----  From: Gabriela Rodrigues  Sent: 6/25/2020   1:59 PM CDT  To: [facility] Staff  Subject: Breast pump authorization                        Need faxed authorization for breast pump. The pharmacy stated they faxed to Dr Rust office on the 6/18. Call back number [phone]. Tks

## 2020-06-26 DIAGNOSIS — O24.419 GESTATIONAL DIABETES MELLITUS (GDM) IN THIRD TRIMESTER, GESTATIONAL DIABETES METHOD OF CONTROL UNSPECIFIED: Primary | ICD-10-CM

## 2020-06-26 NOTE — TELEPHONE ENCOUNTER
----- Message from Shanti Harley sent at 6/26/2020  1:15 PM CDT -----  Regarding: Rx  Contact: BOGDAN HILL [[id number]]  Type:  RX Refill Request    Who Called: pt   Refill or New Rx:refill  RX Name and Strength:metformin (GLUCOPHAGE) 250 MG tablet  How is the patient currently taking it? (ex. 1XDay):2x  Is this a 30 day or 90 day RX 90 day  Preferred Pharmacy with phone number:  [facility] [address] [address]  Phone: [phone] Fax: [phone]  Local or Mail Order:local  Ordering Provider:Barrera   Would the patient rather a call back or a response via [facility]? Call back   Best Call Back Number:[phone]  Additional Information: Caller states that the medication is 500 mg //

## 2020-06-26 NOTE — TELEPHONE ENCOUNTER
Patient requesting refill of medication. Allergies reviewed. Medication pending. Pharmacy up to date. Please approve.

## 2020-06-29 RX ORDER — METFORMIN HYDROCHLORIDE 500 MG/1
500 TABLET ORAL 2 TIMES DAILY WITH MEALS
Qty: 180 TABLET | Refills: 3 | Status: SHIPPED | OUTPATIENT
Start: 2020-06-29 | End: 2020-07-21 | Stop reason: SDUPTHER

## 2020-06-30 DIAGNOSIS — E05.90 HYPERTHYROIDISM IN PREGNANCY, ANTEPARTUM, THIRD TRIMESTER: Primary | ICD-10-CM

## 2020-06-30 DIAGNOSIS — O99.283 HYPERTHYROIDISM IN PREGNANCY, ANTEPARTUM, THIRD TRIMESTER: Primary | ICD-10-CM

## 2020-07-02 ENCOUNTER — PROCEDURE VISIT (OUTPATIENT)
Dept: MATERNAL FETAL MEDICINE | Facility: CLINIC | Age: 25
End: 2020-07-02
Payer: COMMERCIAL

## 2020-07-02 VITALS
OXYGEN SATURATION: 99 % | HEIGHT: 61 IN | WEIGHT: 267 LBS | RESPIRATION RATE: 20 BRPM | HEART RATE: 89 BPM | BODY MASS INDEX: 50.41 KG/M2 | SYSTOLIC BLOOD PRESSURE: 146 MMHG | DIASTOLIC BLOOD PRESSURE: 70 MMHG

## 2020-07-02 DIAGNOSIS — O99.283 HYPERTHYROIDISM IN PREGNANCY, ANTEPARTUM, THIRD TRIMESTER: ICD-10-CM

## 2020-07-02 DIAGNOSIS — E05.90 HYPERTHYROIDISM IN PREGNANCY, ANTEPARTUM, THIRD TRIMESTER: ICD-10-CM

## 2020-07-02 PROCEDURE — 99215 OFFICE O/P EST HI 40 MIN: CPT | Mod: 25,95,, | Performed by: OBSTETRICS & GYNECOLOGY

## 2020-07-02 PROCEDURE — 76816 OB US FOLLOW-UP PER FETUS: CPT | Mod: S$GLB,,, | Performed by: OBSTETRICS & GYNECOLOGY

## 2020-07-02 PROCEDURE — 99215 PR OFFICE/OUTPT VISIT, EST, LEVL V, 40-54 MIN: ICD-10-PCS | Mod: 25,95,, | Performed by: OBSTETRICS & GYNECOLOGY

## 2020-07-02 PROCEDURE — 76816 PR  US,PREGNANT UTERUS,F/U,TRANSABD APP: ICD-10-PCS | Mod: S$GLB,,, | Performed by: OBSTETRICS & GYNECOLOGY

## 2020-07-02 NOTE — PROGRESS NOTES
Indication for follow up consultation:  Thyroid dysfunction in pregnancy  Hypertension in pregnancy  Gestational diabetes    Provider requesting consultation:  Dr. Rust    Dear Dr. Rust    I had the pleasure of seeing Robe Lopez for follow up consultation today via telemedice.  As you recall she is a 24 y.o.  at 29w4d here for follow up consultation regarding hypertension and thyroid dysfunction in pregnancy.    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  The patient is unclear of the etiology of her thyroid dysfunction.  She currently takes methimazole 5 mg 4 times per day and labetalol 200 mg twice per day.  She was taking a low-dose aspirin 81 mg once per day yet she discontinue this medicine.    In regards to gestational diabetes the patient currently takes metformin 500 mg twice per day with her meals.  Recently she has only been taking this at bedtime as she was running low on her medications.  I reviewed her glucose log today and she does have persistently elevated fasting glucose levels though they are just mildly elevated.  Her post prandials are also mildly elevated however she has not been taking her medication correctly per the nursing notes.    Review of systems: The patient denies any vaginal bleeding, loss of fluid or contraction pain today.  Vitals:    20 1235   BP: (!) 146/70   Pulse: 89   Resp: 20   Weight:    Physical exam:  Gen: WDWN in NAD  HEENT: WNL  Abdomen: Soft, non-tender  Skin: No rash or jaundice  Extremities: No clubbing or cyanosis  Neuro: Grossly intact    Ultrasound:  A repeat detailed fetal anatomical survey was completed once again today.  There is a single intrauterine pregnancy in the cephalic presentation.  The estimated fetal weight is 1613 grams which is the 54th percentile for this gestational age. The fetus did not show any overt evidence of structure abnormalities.  The amniotic fluid volume appears to be  normal. The placenta does not show any evidence of previa.  Please see our official report for further specifics.    Recommendations:  Today I reviewed with the patient the management of thyroid dysfunction in pregnancy.  Looking through the electronic medical record it appears that her free T3 and free T4 levels have not been checked recently.  I would suggest that you check her free T3 and free T4 levels at her next visit.  I would titrate her methimazole to a level to keep her free T3 and free T4 levels in the upper 3rd of normal.  Feel free to call or text me with her results and we can discuss medication management.  Please call me on my cell phone [phone] if you need help.    We also discussed the management of hypertension in pregnancy.  I outlined with the patient a goal to try to keep her systolic less than 160 mmHg and her diastolic less than 110 mmHg.  Today her blood pressure is mild range on labetalol 200 mg twice per day and thus I would not change her medication doses for now.  I did  the patient that I want her to restart her low-dose aspirin.    I reviewed over the patient's glucose log and her medication.  Unfortunately we did not discuss this at her visit as we had focused predominantly on her thyroid and hypertension disorder.  I know the patient is set to see you on Monday.  I would suggest that she to refill her prescription and increase her metformin 500 mg in the a.m. and a 1000 mg at bedtime.    From a follow-up standpoint I would like for the patient to return here again in 1 month to reassess fetal growth and well-being.  At your office visits I would suggest some form of fetal well-being testing to begin at 32 weeks and continue until delivery.  If her hypertension, diabetes and thyroid function can be managed well then I would plan for delivery at 37 weeks.  I would plan for delivery prior to that time if he began to see any evidence of fetal compromise.    Thanks once again  for allowing us to participate in the care of your patients.  If you have any questions about today's consultation feel free to contact me or my partners at [phone].    Sincerely,

## 2020-07-02 NOTE — PROGRESS NOTES
"Robe is here for followup [facility] consultation for Hyperthyroidism with recent storm during this pregnancy x 2, and gestational diabetes in pregnancy, referred by Dr. Rust.    She is feeling fetal movement.    Robe denies vaginal bleeding, loss of fluid, recurrent contractions, headaches or epigastric complaints, but does occasionally have a "glazed light" in her R eye.    She did bring her glucose log today.  She is taking medications for control.  Metformin 500 mg PO BID is ordered, but she states that she has only been taking QHS because she was running out and pharmacy would not refill it.    She also takes Methimazole 5 mg PO QID, Labetalol 200 mg PO BID, ASA 81 mg PO daily, and prn Albuterol inhaler.    Vitals:    07/02/20 1235   BP: (!) 146/70   Pulse: 89   Resp: 20   SpO2: 99%   Weight: 121.1 kg (267 lb)   Height: 5' 1" (1.549 m)     BMI:                    50.45 kg/m^2               "

## 2020-07-06 ENCOUNTER — ROUTINE PRENATAL (OUTPATIENT)
Dept: OBSTETRICS AND GYNECOLOGY | Facility: CLINIC | Age: 25
End: 2020-07-06
Payer: COMMERCIAL

## 2020-07-06 VITALS
HEART RATE: 111 BPM | DIASTOLIC BLOOD PRESSURE: 82 MMHG | BODY MASS INDEX: 50.45 KG/M2 | SYSTOLIC BLOOD PRESSURE: 120 MMHG | WEIGHT: 267 LBS

## 2020-07-06 DIAGNOSIS — E05.90 HYPERTHYROIDISM: Primary | ICD-10-CM

## 2020-07-06 DIAGNOSIS — O10.912 PREEXISTING HYPERTENSION COMPLICATING PREGNANCY, ANTEPARTUM, SECOND TRIMESTER: ICD-10-CM

## 2020-07-06 DIAGNOSIS — O99.211 OBESITY AFFECTING PREGNANCY IN FIRST TRIMESTER: ICD-10-CM

## 2020-07-06 DIAGNOSIS — O24.913 DIABETES MELLITUS AFFECTING PREGNANCY IN THIRD TRIMESTER: ICD-10-CM

## 2020-07-06 DIAGNOSIS — O10.919 CHRONIC HYPERTENSION AFFECTING PREGNANCY: ICD-10-CM

## 2020-07-06 DIAGNOSIS — O26.899 RH NEGATIVE STATE IN ANTEPARTUM PERIOD: ICD-10-CM

## 2020-07-06 DIAGNOSIS — Z67.91 RH NEGATIVE STATE IN ANTEPARTUM PERIOD: ICD-10-CM

## 2020-07-06 DIAGNOSIS — Z34.90 PREGNANCY, UNSPECIFIED GESTATIONAL AGE: ICD-10-CM

## 2020-07-06 PROCEDURE — 0502F SUBSEQUENT PRENATAL CARE: CPT | Mod: CPTII,S$GLB,, | Performed by: OBSTETRICS & GYNECOLOGY

## 2020-07-06 PROCEDURE — 0502F PR SUBSEQUENT PRENATAL CARE: ICD-10-PCS | Mod: CPTII,S$GLB,, | Performed by: OBSTETRICS & GYNECOLOGY

## 2020-07-06 NOTE — PROGRESS NOTES
24 y.o. female  at 30w1d good mvts   Reports normal fetal mvts, denies vaginal bleeding, Leaking fluid  or regular contractions  Doing well without concerns  TWG: 15  28wk labs discussed   Tdap  Consents for  and Csec and blood transfusion risk discussed by me.  R&B discussed in detail and consents are signed   Reviewed warning signs, normal fetal movements,  labor precautions and how/when to call.  RTC x 2 wks, call or present sooner prn.   She is taking her labetalol twice day    Will increase her metformin to 1000mg at bedtime.   Also will check free t3 and her t4  She will do a 24 hr urine

## 2020-07-09 LAB
ABS NRBC COUNT: 0 X 10 3/UL (ref 0–0.01)
ABSOLUTE BASOPHIL: 0.03 X 10 3/UL (ref 0–0.22)
ABSOLUTE EOSINOPHIL: 0.22 X 10 3/UL (ref 0.04–0.54)
ABSOLUTE IMMATURE GRAN: 0.14 X 10 3/UL (ref 0–0.04)
ABSOLUTE LYMPHOCYTE: 2.11 X 10 3/UL (ref 0.86–4.75)
ABSOLUTE MONOCYTE: 0.87 X 10 3/UL (ref 0.22–1.08)
ALBUMIN SERPL-MCNC: 3.7 G/DL (ref 3.5–5.2)
ALBUMIN/GLOB SERPL ELPH: 1.2 {RATIO} (ref 1–2.7)
ALP ISOS SERPL LEV INH-CCNC: 112 U/L (ref 35–105)
ALT (SGPT): 13 U/L (ref 0–33)
ANION GAP SERPL CALC-SCNC: 13 MMOL/L (ref 8–17)
AST SERPL-CCNC: 12 U/L (ref 0–32)
BASOPHILS NFR BLD: 0.3 % (ref 0.2–1.2)
BILIRUBIN, TOTAL: 0.21 MG/DL (ref 0–1.2)
BUN/CREAT SERPL: 11.4 (ref 6–20)
CALCIUM SERPL-MCNC: 9.4 MG/DL (ref 8.6–10.2)
CARBON DIOXIDE, CO2: 20 MMOL/L (ref 22–29)
CHLORIDE: 104 MMOL/L (ref 98–107)
CREAT SERPL-MCNC: 0.43 MG/DL (ref 0.5–0.9)
EOSINOPHIL NFR BLD: 1.8 % (ref 0.7–7)
GFR ESTIMATION: 178.91
GLOBULIN: 3.1 G/DL (ref 1.5–4.5)
GLUCOSE: 82 MG/DL (ref 74–106)
HCT VFR BLD AUTO: 29.1 % (ref 37–47)
HGB BLD-MCNC: 9.1 G/DL (ref 12–16)
IMMATURE GRANULOCYTES: 1.2 % (ref 0–0.5)
LDH SERPL L TO P-CCNC: 120 U/L (ref 135–214)
LYMPHOCYTES NFR BLD: 17.6 % (ref 19.3–53.1)
MCH RBC QN AUTO: 25.8 PG (ref 27–32)
MCHC RBC AUTO-ENTMCNC: 31.3 G/DL (ref 32–36)
MCV RBC AUTO: 82.4 FL (ref 82–100)
MONOCYTES NFR BLD: 7.3 % (ref 4.7–12.5)
NEUTROPHILS ABSOLUTE COUNT: 8.6 X 10 3/UL (ref 2.15–7.56)
NEUTROPHILS NFR BLD: 71.8 %
NUCLEATED RED BLOOD CELLS: 0 /100 WBC (ref 0–0.2)
PLATELET # BLD AUTO: 477 X 10 3/UL (ref 135–400)
POTASSIUM: 3.7 MMOL/L (ref 3.5–5.1)
PROT 24H UR-MRATE: 0.28 GM/24HR (ref 0.01–0.15)
PROT SNV-MCNC: 6.8 G/DL (ref 6.4–8.3)
RBC # BLD AUTO: 3.53 X 10 6/UL (ref 4.2–5.4)
RDW-SD: 46.1 FL (ref 37–54)
SODIUM: 137 MMOL/L (ref 136–145)
T3FREE SERPL DIALY-MCNC: 2.52 PG/ML (ref 2–4.4)
T4, FREE: 0.96 NG/DL (ref 0.93–1.7)
TOTAL VOLUME: 1000 ML/24HR
TSH SERPL DL<=0.005 MIU/L-ACNC: 0.03 UIU/ML (ref 0.27–4.2)
URATE SERPL-MCNC: 5.3 MG/DL (ref 2.4–5.7)
UREA NITROGEN (BUN): 4.9 MG/DL (ref 6–20)
WBC # BLD: 11.97 X 10 3/UL (ref 4.3–10.8)

## 2020-07-15 ENCOUNTER — TELEPHONE (OUTPATIENT)
Dept: OBSTETRICS AND GYNECOLOGY | Facility: CLINIC | Age: 25
End: 2020-07-15

## 2020-07-15 NOTE — TELEPHONE ENCOUNTER
OB  31 W 3 D  PATIENT IS FEELING LIGHTHEADED AND FAINT  REPORTS  B/P 110 / 55  GLUCOSE:    124 AFTER BREAKFAST     SHE IS TAKING LABETALOL 200 MG BID    PLEASE ADVISE

## 2020-07-16 NOTE — TELEPHONE ENCOUNTER
Phone call returned to patient   She is advised to stay well hydrated, good nutrition  Eat several small meals throughout the day  Avoid refined sugars and excessive carbs Get plenty of rest  Monitor glucose and b/p   Keep scheduled ob visit  Patient acknowledges with verbal understanding

## 2020-07-17 ENCOUNTER — TELEPHONE (OUTPATIENT)
Dept: OBSTETRICS AND GYNECOLOGY | Facility: CLINIC | Age: 25
End: 2020-07-17

## 2020-07-17 NOTE — TELEPHONE ENCOUNTER
----- Message from Orville Del Toro sent at 7/17/2020  1:57 PM CDT -----  Regarding: Breast pump prescription  Grace with [facility] needs a prescription for a breast pump for this patient [phone] ext [phone].

## 2020-07-21 ENCOUNTER — ROUTINE PRENATAL (OUTPATIENT)
Dept: OBSTETRICS AND GYNECOLOGY | Facility: CLINIC | Age: 25
End: 2020-07-21
Payer: COMMERCIAL

## 2020-07-21 VITALS
WEIGHT: 270 LBS | DIASTOLIC BLOOD PRESSURE: 77 MMHG | HEART RATE: 111 BPM | SYSTOLIC BLOOD PRESSURE: 122 MMHG | BODY MASS INDEX: 51.02 KG/M2

## 2020-07-21 DIAGNOSIS — O10.912 PREEXISTING HYPERTENSION COMPLICATING PREGNANCY, ANTEPARTUM, SECOND TRIMESTER: ICD-10-CM

## 2020-07-21 DIAGNOSIS — O99.211 OBESITY AFFECTING PREGNANCY IN FIRST TRIMESTER: ICD-10-CM

## 2020-07-21 DIAGNOSIS — O24.419 GESTATIONAL DIABETES MELLITUS (GDM) IN THIRD TRIMESTER, GESTATIONAL DIABETES METHOD OF CONTROL UNSPECIFIED: ICD-10-CM

## 2020-07-21 DIAGNOSIS — Z34.90 PREGNANCY, UNSPECIFIED GESTATIONAL AGE: Primary | ICD-10-CM

## 2020-07-21 DIAGNOSIS — O26.899 RH NEGATIVE STATE IN ANTEPARTUM PERIOD: ICD-10-CM

## 2020-07-21 DIAGNOSIS — Z67.91 RH NEGATIVE STATE IN ANTEPARTUM PERIOD: ICD-10-CM

## 2020-07-21 PROCEDURE — 0502F SUBSEQUENT PRENATAL CARE: CPT | Mod: CPTII,S$GLB,, | Performed by: OBSTETRICS & GYNECOLOGY

## 2020-07-21 PROCEDURE — 0502F PR SUBSEQUENT PRENATAL CARE: ICD-10-PCS | Mod: CPTII,S$GLB,, | Performed by: OBSTETRICS & GYNECOLOGY

## 2020-07-21 RX ORDER — METFORMIN HYDROCHLORIDE 500 MG/1
500 TABLET ORAL
Qty: 270 TABLET | Refills: 3 | Status: CANCELLED | OUTPATIENT
Start: 2020-07-21 | End: 2021-07-21

## 2020-07-21 RX ORDER — METFORMIN HYDROCHLORIDE 500 MG/1
500 TABLET ORAL
Qty: 270 TABLET | Refills: 3 | Status: SHIPPED | OUTPATIENT
Start: 2020-07-21 | End: 2021-07-21

## 2020-07-21 NOTE — TELEPHONE ENCOUNTER
OV today. Patient needs increase/refill of metformin. Allergies reviewed. Medication pending. Pharmacy up to date. Please approve.

## 2020-07-21 NOTE — PROGRESS NOTES
24 y.o. female  at 32w2d   Reports normal fetal mvts, denies vaginal bleeding, Leaking fluid  or regular contractions  Doing well without concerns   TWwk labs discussed (  Rhogam   Tdap    Reviewed warning signs, normal fetal movements,  labor precautions and how/when to call.  RTC x 2 wks, call or present sooner prn.   Her 2hr post prandial are 105-125 and fasting are 103  She is taking  Metformin at night   Will increase to 1000 at night and  Cont the 500 in the am

## 2020-07-21 NOTE — PROGRESS NOTES
Shortness of breath. Has noticed low blood pressure in the am. Some light headedness and dizziness.

## 2020-07-22 ENCOUNTER — PROCEDURE VISIT (OUTPATIENT)
Dept: OBSTETRICS AND GYNECOLOGY | Facility: CLINIC | Age: 25
End: 2020-07-22
Payer: COMMERCIAL

## 2020-07-22 DIAGNOSIS — Z3A.24 24 WEEKS GESTATION OF PREGNANCY: ICD-10-CM

## 2020-07-22 PROCEDURE — 76819 PR US, OB, FETAL BIOPHYSICAL, W/O NST: ICD-10-PCS | Mod: S$GLB,,, | Performed by: OBSTETRICS & GYNECOLOGY

## 2020-07-22 PROCEDURE — 76819 FETAL BIOPHYS PROFIL W/O NST: CPT | Mod: S$GLB,,, | Performed by: OBSTETRICS & GYNECOLOGY

## 2020-07-27 DIAGNOSIS — O99.283 HYPERTHYROIDISM IN PREGNANCY, ANTEPARTUM, THIRD TRIMESTER: Primary | ICD-10-CM

## 2020-07-27 DIAGNOSIS — E05.90 HYPERTHYROIDISM IN PREGNANCY, ANTEPARTUM, THIRD TRIMESTER: Primary | ICD-10-CM

## 2020-07-30 ENCOUNTER — ROUTINE PRENATAL (OUTPATIENT)
Dept: OBSTETRICS AND GYNECOLOGY | Facility: CLINIC | Age: 25
End: 2020-07-30
Payer: COMMERCIAL

## 2020-07-30 ENCOUNTER — PROCEDURE VISIT (OUTPATIENT)
Dept: OBSTETRICS AND GYNECOLOGY | Facility: CLINIC | Age: 25
End: 2020-07-30
Payer: COMMERCIAL

## 2020-07-30 VITALS
SYSTOLIC BLOOD PRESSURE: 115 MMHG | BODY MASS INDEX: 50.26 KG/M2 | WEIGHT: 266 LBS | DIASTOLIC BLOOD PRESSURE: 80 MMHG | HEART RATE: 114 BPM

## 2020-07-30 DIAGNOSIS — Z34.90 PREGNANCY, UNSPECIFIED GESTATIONAL AGE: ICD-10-CM

## 2020-07-30 DIAGNOSIS — Z67.91 RH NEGATIVE STATE IN ANTEPARTUM PERIOD: ICD-10-CM

## 2020-07-30 DIAGNOSIS — O99.211 OBESITY AFFECTING PREGNANCY IN FIRST TRIMESTER: ICD-10-CM

## 2020-07-30 DIAGNOSIS — O10.912 PREEXISTING HYPERTENSION COMPLICATING PREGNANCY, ANTEPARTUM, SECOND TRIMESTER: ICD-10-CM

## 2020-07-30 DIAGNOSIS — O26.899 RH NEGATIVE STATE IN ANTEPARTUM PERIOD: ICD-10-CM

## 2020-07-30 PROCEDURE — 76819 FETAL BIOPHYS PROFIL W/O NST: CPT | Mod: S$GLB,,, | Performed by: OBSTETRICS & GYNECOLOGY

## 2020-07-30 PROCEDURE — 76819 PR US, OB, FETAL BIOPHYSICAL, W/O NST: ICD-10-PCS | Mod: S$GLB,,, | Performed by: OBSTETRICS & GYNECOLOGY

## 2020-07-30 PROCEDURE — 0502F SUBSEQUENT PRENATAL CARE: CPT | Mod: CPTII,S$GLB,, | Performed by: OBSTETRICS & GYNECOLOGY

## 2020-07-30 PROCEDURE — 0502F PR SUBSEQUENT PRENATAL CARE: ICD-10-PCS | Mod: CPTII,S$GLB,, | Performed by: OBSTETRICS & GYNECOLOGY

## 2020-07-30 NOTE — PROGRESS NOTES
24 y.o. female  at 33w4d   Reports normal fetal mvts, denies vaginal bleeding, Leaking fluid  or regular contractions  Doing well without concerns   Her fastings are now 97   Hers are 116   At night after  Dinner she will run 120-130  After breakfast and lunch 103-116        TW    Tdap  Reviewed warning signs, normal fetal movements,  labor precautions and how/when to call.  RTC x 2 wks, call or present sooner prn. mfm next week

## 2020-08-03 ENCOUNTER — PROCEDURE VISIT (OUTPATIENT)
Dept: MATERNAL FETAL MEDICINE | Facility: CLINIC | Age: 25
End: 2020-08-03
Payer: COMMERCIAL

## 2020-08-03 VITALS
RESPIRATION RATE: 20 BRPM | WEIGHT: 267 LBS | SYSTOLIC BLOOD PRESSURE: 110 MMHG | HEART RATE: 112 BPM | DIASTOLIC BLOOD PRESSURE: 70 MMHG | BODY MASS INDEX: 50.41 KG/M2 | HEIGHT: 61 IN | OXYGEN SATURATION: 98 %

## 2020-08-03 DIAGNOSIS — O99.283 HYPERTHYROIDISM IN PREGNANCY, ANTEPARTUM, THIRD TRIMESTER: ICD-10-CM

## 2020-08-03 DIAGNOSIS — E05.90 HYPERTHYROIDISM IN PREGNANCY, ANTEPARTUM, THIRD TRIMESTER: ICD-10-CM

## 2020-08-03 PROCEDURE — 99215 PR OFFICE/OUTPT VISIT, EST, LEVL V, 40-54 MIN: ICD-10-PCS | Mod: 95,25,S$GLB, | Performed by: OBSTETRICS & GYNECOLOGY

## 2020-08-03 PROCEDURE — 76819 PR US, OB, FETAL BIOPHYSICAL, W/O NST: ICD-10-PCS | Mod: S$GLB,,, | Performed by: OBSTETRICS & GYNECOLOGY

## 2020-08-03 PROCEDURE — 76816 PR  US,PREGNANT UTERUS,F/U,TRANSABD APP: ICD-10-PCS | Mod: S$GLB,,, | Performed by: OBSTETRICS & GYNECOLOGY

## 2020-08-03 PROCEDURE — 76819 FETAL BIOPHYS PROFIL W/O NST: CPT | Mod: S$GLB,,, | Performed by: OBSTETRICS & GYNECOLOGY

## 2020-08-03 PROCEDURE — 76816 OB US FOLLOW-UP PER FETUS: CPT | Mod: S$GLB,,, | Performed by: OBSTETRICS & GYNECOLOGY

## 2020-08-03 PROCEDURE — 99215 OFFICE O/P EST HI 40 MIN: CPT | Mod: 95,25,S$GLB, | Performed by: OBSTETRICS & GYNECOLOGY

## 2020-08-03 NOTE — PROGRESS NOTES
"Robe is here for followup [facility] consultation for gestational diabetes, hyperthyroidism, and gestational HTN in pregnancy, referred by Dr. Rust.    She is feeling fetal movement.    Robe denies vaginal bleeding, loss of fluid, recurrent contractions, headaches, visual, or epigastric complaints.    She did bring her glucose log today.  She is taking medications for control.      Metformin 500 mg QAM, and 1000 mg QPM with meals.    She also takes Labetalol 200 mg PO BID, ASA 81 mg PO daily, and Methimazole 5 mg QID.      Vitals:    08/03/20 1232   BP: 110/70   Pulse: (!) 112   Resp: 20   SpO2: 98%   Weight: 121.1 kg (267 lb)   Height: 5' 1" (1.549 m)     BMI:                    50.45 kg/m^2                 "

## 2020-08-03 NOTE — PROGRESS NOTES
Indication for follow up consultation:  Thyroid dysfunction in pregnancy (hyperthyroidism)  Chronic hypertension requiring medication in pregnancy  Gestational diabetes requiring medication      Provider requesting consultation:  Dr. Rust    Dear Dr. Rust    I had the pleasure of seeing Robe Lopez for follow up consultation today via telemedicine.  As you recall she is a 24 y.o.  at 34 4/7 weeks EGA here for follow up consultation regarding gestational diabetes, hyperthyroidism and chronic hypertension requiring medication in pregnancy.    She did bring her glucose log today.  She is taking medications for control to include Metformin 500 mg QAM, and 1000 mg QPM with meals.  Her log book notes that her fasting levels are in the low 100s while her postprandial levels are almost always less than 120 mg/dL.     For her hypertension she takes Labetalol 200 mg PO BID and a ASA 81 mg PO daily.  Because of her hypertension she is undergoing fetal well-being testing in your office at least once per week.    From a hyperthyroidism standpoint she currently takes Methimazole 5 mg QID.  She denies any sweating, chest pain or palpitations.    Review of systems: The patient denies any vaginal bleeding, loss of fluid or contraction pain today.  Vitals:    20 1232   BP: 110/70   Pulse: (!) 112   Resp: 20       Physical exam:  Gen: WDWN in NAD  HEENT: WNL  Abdomen: Soft, non-tender  Skin: No rash or jaundice  Extremities: No clubbing or cyanosis  Neuro: Grossly intact    Ultrasound:  A repeat detailed fetal anatomical survey was completed once again today.  There is a single intrauterine pregnancy in the cephalic presentation.  The estimated fetal weight is 2284 grams which is the 16th percentile for this gestational age. The fetus did not show any overt evidence of structure abnormalities.  The amniotic fluid volume appears to be normal. The placenta does not show any evidence of previa.  BPP is 8/8. Please see our  official report for further specifics.    Recommendations:  Today I reviewed with the patient the management of hypertension in pregnancy.  Her blood pressure today is normotensive and thus I did not change her medications.  I would recommend she continue her labetalol and low-dose aspirin throughout pregnancy.  Because of her hypertension I would continue with some form of fetal well-being testing such as once weekly biophysical profile or twice weekly nonstress testing until delivery.  In regards to timing of delivery I would plan for delivery at 37-38 weeks estimated gestational age.    I also reviewed with the patient the management of hyperthyroidism in pregnancy.  Since she is asymptomatic from a hyperthyroidism standpoint I did not change her medications this time.  I would recommend to recheck her thyroid function at 4-6 weeks postpartum.    From a diabetes standpoint, her fasting glucose levels are elevated yet her postprandial levels are almost always normal.  Because the fetus is at the 16th percentile and does not show any evidence of over growth I did not change her medications at this time at this late gestational age.  I would recommend she continue with metformin as outlined above.  I would continue this until delivery with a plan to re-screening the patient for diabetes at the 6 weeks postpartum visit    At this late gestational age I did not schedule any further Maternal-Fetal Medicine visits.    Thanks once again for allowing us to participate in the care of your patients.  If you have any questions about today's consultation feel free to contact me or my partners at [phone].    Sincerely,

## 2020-08-04 ENCOUNTER — TELEPHONE (OUTPATIENT)
Dept: OBSTETRICS AND GYNECOLOGY | Facility: CLINIC | Age: 25
End: 2020-08-04

## 2020-08-04 DIAGNOSIS — N39.0 URINARY TRACT INFECTION WITHOUT HEMATURIA, SITE UNSPECIFIED: Primary | ICD-10-CM

## 2020-08-04 LAB
APPEARANCE, UA: CLEAR
BACTERIA SPEC CULT: ABNORMAL /HPF
BILIRUB UR QL STRIP: NEGATIVE MG/DL
COLOR UR: ABNORMAL
GLUCOSE (UA): NORMAL MG/DL
HGB UR QL STRIP: 10 /UL
KETONES UR QL STRIP: ABNORMAL MG/DL
LEUKOCYTE ESTERASE UR QL STRIP: 100 /UL
NITRITE UR QL STRIP: NEGATIVE
PH UR STRIP: 6.5 PH (ref 5–9)
PROT UR QL STRIP: 30 MG/DL
RBC #/AREA URNS HPF: ABNORMAL /HPF (ref 0–2)
SERVICE COMMENT 03: ABNORMAL
SP GR UR STRIP: 1.01 (ref 1–1.03)
SPECIMEN COLLECTION METHOD, URINE: ABNORMAL
SQUAMOUS EPITHELIAL, UA: ABNORMAL /LPF
UROBILINOGEN UR STRIP-ACNC: 4 MG/DL
WBC #/AREA URNS HPF: ABNORMAL /HPF (ref 0–5)

## 2020-08-04 RX ORDER — CEPHALEXIN 500 MG/1
500 CAPSULE ORAL 3 TIMES DAILY
Qty: 21 CAPSULE | Refills: 0 | Status: SHIPPED | OUTPATIENT
Start: 2020-08-04 | End: 2020-09-28

## 2020-08-04 NOTE — TELEPHONE ENCOUNTER
PATIENT REPORTS  SIGNIFICANT NCREASED VAGINAL PRESSURE, LOWER ABDOMINAL   URINARY FREQUENCY, CRAMPING, LOW BACK PAIN  REPORTS GOOD FETAL MOVEMENT  PATIENT IS ADVISED COULD POSSIBLY BE A UTI BUT WITH THE COMPLAINT OF INCREASED VAGINAL PRESSURE SHE IS ADVISED TO GO TO LABOR AND DELIVERY FOR EVALUATION  PATIENT ACKNOWLEDGES WITH VERBAL UNDERSTANDING

## 2020-08-04 NOTE — TELEPHONE ENCOUNTER
----- Message from Mami Khoury sent at 8/4/2020 10:05 AM CDT -----  Type:  Needs Medical Advice    Who Called: pt   Symptoms (please be specific): lower back pain, pressure    How long has patient had these symptoms:  few days   Pharmacy name and phone #:    Would the patient rather a call back or a response via [facility]? Callback   Best Call Back Number: [phone]   Additional Information: pt stated her stomach have drop a lot.        2017 10:00

## 2020-08-07 ENCOUNTER — TELEPHONE (OUTPATIENT)
Dept: OBSTETRICS AND GYNECOLOGY | Facility: CLINIC | Age: 25
End: 2020-08-07

## 2020-08-07 DIAGNOSIS — Z34.90 PREGNANCY, UNSPECIFIED GESTATIONAL AGE: Primary | ICD-10-CM

## 2020-08-10 ENCOUNTER — PROCEDURE VISIT (OUTPATIENT)
Dept: OBSTETRICS AND GYNECOLOGY | Facility: CLINIC | Age: 25
End: 2020-08-10
Payer: COMMERCIAL

## 2020-08-10 ENCOUNTER — ROUTINE PRENATAL (OUTPATIENT)
Dept: OBSTETRICS AND GYNECOLOGY | Facility: CLINIC | Age: 25
End: 2020-08-10
Payer: COMMERCIAL

## 2020-08-10 VITALS
DIASTOLIC BLOOD PRESSURE: 79 MMHG | HEART RATE: 119 BPM | SYSTOLIC BLOOD PRESSURE: 123 MMHG | BODY MASS INDEX: 51.02 KG/M2 | WEIGHT: 270 LBS

## 2020-08-10 DIAGNOSIS — Z34.90 PREGNANCY, UNSPECIFIED GESTATIONAL AGE: ICD-10-CM

## 2020-08-10 DIAGNOSIS — Z67.91 RH NEGATIVE STATE IN ANTEPARTUM PERIOD: ICD-10-CM

## 2020-08-10 DIAGNOSIS — O99.211 OBESITY AFFECTING PREGNANCY IN FIRST TRIMESTER: ICD-10-CM

## 2020-08-10 DIAGNOSIS — Z34.93 THIRD TRIMESTER PREGNANCY: Primary | ICD-10-CM

## 2020-08-10 DIAGNOSIS — O26.899 RH NEGATIVE STATE IN ANTEPARTUM PERIOD: ICD-10-CM

## 2020-08-10 DIAGNOSIS — O10.912 PREEXISTING HYPERTENSION COMPLICATING PREGNANCY, ANTEPARTUM, SECOND TRIMESTER: ICD-10-CM

## 2020-08-10 PROCEDURE — 76819 PR US, OB, FETAL BIOPHYSICAL, W/O NST: ICD-10-PCS | Mod: S$GLB,,, | Performed by: OBSTETRICS & GYNECOLOGY

## 2020-08-10 PROCEDURE — 0502F SUBSEQUENT PRENATAL CARE: CPT | Mod: CPTII,S$GLB,, | Performed by: OBSTETRICS & GYNECOLOGY

## 2020-08-10 PROCEDURE — 76819 FETAL BIOPHYS PROFIL W/O NST: CPT | Mod: S$GLB,,, | Performed by: OBSTETRICS & GYNECOLOGY

## 2020-08-10 PROCEDURE — 0502F PR SUBSEQUENT PRENATAL CARE: ICD-10-PCS | Mod: CPTII,S$GLB,, | Performed by: OBSTETRICS & GYNECOLOGY

## 2020-08-10 NOTE — PROGRESS NOTES
24 y.o. female  at 35w1d  Reports + FM, denies VB, Leaking or regular CTX  Doing well without concerns   TW  GBS collected    Reviewed warning signs, normal fetal movements, labor precautions and how/when to call.  RTC 1 wks, or sooner prn. Labor and delivery PRN   Discussed the Csec and timing

## 2020-08-11 LAB
GROUP B STREP MOLECULAR: NEGATIVE
PENICILLIN ALLERGIC: NORMAL

## 2020-08-18 ENCOUNTER — ROUTINE PRENATAL (OUTPATIENT)
Dept: OBSTETRICS AND GYNECOLOGY | Facility: CLINIC | Age: 25
End: 2020-08-18
Payer: COMMERCIAL

## 2020-08-18 ENCOUNTER — PROCEDURE VISIT (OUTPATIENT)
Dept: OBSTETRICS AND GYNECOLOGY | Facility: CLINIC | Age: 25
End: 2020-08-18
Payer: COMMERCIAL

## 2020-08-18 VITALS
BODY MASS INDEX: 50.07 KG/M2 | SYSTOLIC BLOOD PRESSURE: 127 MMHG | WEIGHT: 265 LBS | DIASTOLIC BLOOD PRESSURE: 74 MMHG | HEART RATE: 106 BPM

## 2020-08-18 DIAGNOSIS — Z67.91 RH NEGATIVE STATE IN ANTEPARTUM PERIOD: ICD-10-CM

## 2020-08-18 DIAGNOSIS — O10.912 PREEXISTING HYPERTENSION COMPLICATING PREGNANCY, ANTEPARTUM, SECOND TRIMESTER: ICD-10-CM

## 2020-08-18 DIAGNOSIS — O99.211 OBESITY AFFECTING PREGNANCY IN FIRST TRIMESTER: ICD-10-CM

## 2020-08-18 DIAGNOSIS — Z34.93 THIRD TRIMESTER PREGNANCY: ICD-10-CM

## 2020-08-18 DIAGNOSIS — Z34.93 THIRD TRIMESTER PREGNANCY: Primary | ICD-10-CM

## 2020-08-18 DIAGNOSIS — O26.899 RH NEGATIVE STATE IN ANTEPARTUM PERIOD: ICD-10-CM

## 2020-08-18 PROCEDURE — 0502F SUBSEQUENT PRENATAL CARE: CPT | Mod: CPTII,S$GLB,, | Performed by: OBSTETRICS & GYNECOLOGY

## 2020-08-18 PROCEDURE — 76819 PR US, OB, FETAL BIOPHYSICAL, W/O NST: ICD-10-PCS | Mod: S$GLB,,, | Performed by: OBSTETRICS & GYNECOLOGY

## 2020-08-18 PROCEDURE — 76819 FETAL BIOPHYS PROFIL W/O NST: CPT | Mod: S$GLB,,, | Performed by: OBSTETRICS & GYNECOLOGY

## 2020-08-18 PROCEDURE — 0502F PR SUBSEQUENT PRENATAL CARE: ICD-10-PCS | Mod: CPTII,S$GLB,, | Performed by: OBSTETRICS & GYNECOLOGY

## 2020-08-18 NOTE — PROGRESS NOTES
24 y.o. female  at 36w2d  Reports + FM, denies VB, Leaking or regular CTX  Doing well without concerns   TW  GBS collected    Reviewed warning signs, normal fetal movements, labor precautions and how/when to call.  RTC 1 wks, or sooner prn. Labor and delivery PRN   Her glucose is good   LPs stressed

## 2020-08-23 ENCOUNTER — OUTSIDE PLACE OF SERVICE (OUTPATIENT)
Dept: OBSTETRICS AND GYNECOLOGY | Facility: CLINIC | Age: 25
End: 2020-08-23
Payer: COMMERCIAL

## 2020-08-23 PROCEDURE — 44950 PR APPENDECTOMY: ICD-10-PCS | Mod: 59,,, | Performed by: OBSTETRICS & GYNECOLOGY

## 2020-08-23 PROCEDURE — 59510 CESAREAN DELIVERY: CPT | Mod: AT,,, | Performed by: OBSTETRICS & GYNECOLOGY

## 2020-08-23 PROCEDURE — 44950 APPENDECTOMY: CPT | Mod: 59,,, | Performed by: OBSTETRICS & GYNECOLOGY

## 2020-08-23 PROCEDURE — 59510 PR FULL ROUT OBSTE CARE,CESAREAN DELIV: ICD-10-PCS | Mod: AT,,, | Performed by: OBSTETRICS & GYNECOLOGY

## 2020-08-24 ENCOUNTER — TELEPHONE (OUTPATIENT)
Dept: OBSTETRICS AND GYNECOLOGY | Facility: CLINIC | Age: 25
End: 2020-08-24

## 2020-08-24 NOTE — TELEPHONE ENCOUNTER
----- Message from Maddy Cantu sent at 8/24/2020  3:40 PM CDT -----  Regarding: patient advice  Contact: patient's mom  Would like to consult with nurse regarding pain patient is in. Patient's mom is stating that patient went in to deliver baby on yesterday and she is in constant pain and is needing medication. Patient's mom stated she is at [facility] room 252. Please call back at [phone]

## 2020-08-24 NOTE — TELEPHONE ENCOUNTER
Dr. Rust spoke to nurses station at [facility], and the nurses will be going to take care of Robe. Dr. Rust advises if we need to change her pain meds, they can do that as well.

## 2020-08-26 ENCOUNTER — OUTSIDE PLACE OF SERVICE (OUTPATIENT)
Dept: OBSTETRICS AND GYNECOLOGY | Facility: CLINIC | Age: 25
End: 2020-08-26
Payer: COMMERCIAL

## 2020-08-26 PROCEDURE — 99024 POSTOP FOLLOW-UP VISIT: CPT | Mod: ,,, | Performed by: OBSTETRICS & GYNECOLOGY

## 2020-08-26 PROCEDURE — 99024 PR POST-OP FOLLOW-UP VISIT: ICD-10-PCS | Mod: ,,, | Performed by: OBSTETRICS & GYNECOLOGY

## 2020-09-15 ENCOUNTER — TELEPHONE (OUTPATIENT)
Dept: OBSTETRICS AND GYNECOLOGY | Facility: CLINIC | Age: 25
End: 2020-09-15

## 2020-09-15 NOTE — TELEPHONE ENCOUNTER
----- Message from Shanti Harley sent at 9/15/2020  2:32 PM CDT -----  Contact: BOGDAN HILL [[id number]]  Type:  Needs Medical Advice    Who Called: pt  Symptoms (please be specific): post appointment    How long has patient had these symptoms:    Pharmacy name and phone #:    Would the patient rather a call back or a response via [facility]? Call back   Best Call Back Number:[phone]   Additional Information: caller is calling in regards to her missing her post appt // pt states she needs someone to call her back //

## 2020-09-28 ENCOUNTER — POSTPARTUM VISIT (OUTPATIENT)
Dept: OBSTETRICS AND GYNECOLOGY | Facility: CLINIC | Age: 25
End: 2020-09-28
Payer: COMMERCIAL

## 2020-09-28 VITALS
WEIGHT: 245.19 LBS | SYSTOLIC BLOOD PRESSURE: 101 MMHG | HEIGHT: 61 IN | BODY MASS INDEX: 46.29 KG/M2 | DIASTOLIC BLOOD PRESSURE: 62 MMHG

## 2020-09-28 DIAGNOSIS — N76.0 ACUTE VAGINITIS: Primary | ICD-10-CM

## 2020-09-28 DIAGNOSIS — E03.9 HYPOTHYROIDISM, UNSPECIFIED TYPE: ICD-10-CM

## 2020-09-28 RX ORDER — TINIDAZOLE 500 MG/1
2 TABLET ORAL ONCE
Qty: 8 TABLET | Refills: 0 | Status: SHIPPED | OUTPATIENT
Start: 2020-09-28 | End: 2020-09-28

## 2020-09-28 NOTE — PROGRESS NOTES
Subjective:       Patient ID: Robe Lopez is a 24 y.o. female.    Chief Complaint:  Postpartum Care (5 weeks post C -section  patient is doing well  she is breastfeeding   she has an appt with lactation specialist     baby is doing well)      History of Present Illness  She is doing well. No abnormal bleeding, No GI or Gu concerns,    No other concerns  Post partum blues/anxiety discussed  She is not having PP blues at this time  She is is doing well   She does not want birth control   Good pain control  She has noted a fishy odor     GYN & OB History  No LMP recorded.     Date of Last Pap: No result found    OB History    Para Term  AB Living   1 0       0   SAB TAB Ectopic Multiple Live Births                  # Outcome Date GA Lbr Kiel/2nd Weight Sex Delivery Anes PTL Lv   1                 Review of Systems  Review of Systems          Objective:    Physical Exam:   Constitutional: She appears well-developed.             Abdominal: Soft. Normal appearance.   Incision is dry intact, and no evidence of infection     Genitourinary:    Vagina and uterus normal.      Pelvic exam was performed with patient supine.   There is no tenderness or lesion on the right labia. There is no tenderness or lesion on the left labia. Uterus is not tender. Cervix is normal. Right adnexum displays no tenderness and no fullness. Left adnexum displays no tenderness and no fullness. Labial bartholins normal.Vaginal discharge: fishy.              Neurological: She is alert.     Psychiatric: She has a normal mood and affect. Her speech is normal and behavior is normal.       Her incision looks very good.  Healing nicely  no evidence of infection    No sig abdominal tenderness     Pelvic exam  Vulva normal, Vagina normal,  Uterus is normal size, non tender and no adnexal masses.       Assessment:        1. Acute vaginitis                 Plan:         she has been off her thyroid meds as well as blood pressure and  metformin.   I would restart the metformin and recheck her TSH and free t4.   No need to restart blood pressure   Will start tindamax   She is to return for any reason.     Birth control is discussed  She is to follow up for her annual pap and exam.  This appointment is set with her before leaving today  Chaperone was present for the exam

## 2020-10-05 DIAGNOSIS — B96.89 BACTERIAL VAGINITIS: ICD-10-CM

## 2020-10-05 DIAGNOSIS — N76.0 BACTERIAL VAGINITIS: ICD-10-CM

## 2020-10-05 DIAGNOSIS — Z30.9 ENCOUNTER FOR CONTRACEPTIVE MANAGEMENT, UNSPECIFIED TYPE: Primary | ICD-10-CM

## 2020-10-05 RX ORDER — METRONIDAZOLE 500 MG/1
500 TABLET ORAL 2 TIMES DAILY
Qty: 14 TABLET | Refills: 0 | Status: SHIPPED | OUTPATIENT
Start: 2020-10-05 | End: 2020-10-12

## 2020-10-05 RX ORDER — ACETAMINOPHEN AND CODEINE PHOSPHATE 120; 12 MG/5ML; MG/5ML
1 SOLUTION ORAL DAILY
Qty: 28 TABLET | Refills: 4 | Status: SHIPPED | OUTPATIENT
Start: 2020-10-05 | End: 2021-11-05

## 2020-10-05 NOTE — TELEPHONE ENCOUNTER
Patient states that she went to the er last night and was dx with ruptured ovarian cyst. She was put on pain meds. C/o heavy bleeding and would like something to slow that down. She is breast feeding. Also req something safe for breastfeeding for bv.

## 2020-12-17 RX ORDER — ESCITALOPRAM OXALATE 20 MG/1
20 TABLET ORAL DAILY
Qty: 30 TABLET | Refills: 6 | Status: SHIPPED | OUTPATIENT
Start: 2020-12-17 | End: 2022-06-16

## 2020-12-17 NOTE — TELEPHONE ENCOUNTER
DELIVERED FOUR MONTHS AGO   STRUGGLING WITH DEPRESSION   DENIES HARMFUL THOUGHTS TO BABY OR ANYONE ELSE   FEELS LIKE HER BABY WOULD BE BETTER OFF WITH SOMEONE ELSE  BABY SLEEPS ALL NIGHT   PATIENT IS UNABLE TO SLEEP  SHE HAS HELP AT HOME     PATIENT CANT REST SHE WORRIES IF HER BABY IS BREATHING AT NIGHT AND IF SHE IS PROVIDING THE RIGHT CARE FOR THE BABY  SHE DOES NOT HAVE AN APPETITE  SHE DOES NOT ENJOY ANYTHING.   MOOD SWINGS    ALLERGIES NKDA    NOT CURRENTLY TAKING ANY MEDICATIONS    DR AVELAR IS CONSULTED ORDERS LEXAPRO 10 MG FOR TWO WEEKS THEN LEXAPRO 20 MG THEREAFTER  PHONE CALL RETURNED  DISCUSSED LEXAPRO AND DIRECTIONS WITH PATIENT   PATIENT IS ADVISED TO CALL ME OR GO TO [facility] ER IF HER SYMPTOMS DO NOT IMPROVE OR WORSEN   IF SHE HAS HARMFUL THOUGHTS TO HERSELF , BABY OR ANYONE ELSE   SHE IS ADVISED TO GO TO  ER  PATIENT IS SCHEDULED A MEDICATION AND FOLLOW UP POSTPARTUM VISIT IN THREE WEEKS  PATIENT ACKNOWLEDGES WITH VERBAL UNDERSTANDING

## 2020-12-17 NOTE — TELEPHONE ENCOUNTER
----- Message from Maddy Cantu sent at 12/17/2020 11:39 AM CST -----  Regarding: patient advice  Contact: patient  Would like to consult with nurse in regards to getting on postpartum medication. Please call back at [phone]

## 2020-12-23 RX ORDER — SERTRALINE HYDROCHLORIDE 50 MG/1
50 TABLET, FILM COATED ORAL DAILY
Qty: 30 TABLET | Refills: 3 | Status: SHIPPED | OUTPATIENT
Start: 2020-12-23 | End: 2022-06-16

## 2020-12-23 NOTE — TELEPHONE ENCOUNTER
I CALLED PATIENT TO FOLLOW UP ON POSTPARTUM DEPRESSION  SHE STARTED LEXAPRO ONE WEEK AGO  PATIENT REPORTS LEXAPRO  CAUSING HALLUCINATIONS  SHE DISCONTINUED YESTERDAY  PATIENT FEELS A LITTLE BETTER  SHE NEEDS TO TRY A DIFFERENT MEDICATION    LEXAPRO ADDED AS A MEDICATION ALLERGY

## 2021-06-15 ENCOUNTER — OFFICE VISIT (OUTPATIENT)
Dept: OBSTETRICS AND GYNECOLOGY | Facility: CLINIC | Age: 26
End: 2021-06-15
Payer: MEDICAID

## 2021-06-15 VITALS
DIASTOLIC BLOOD PRESSURE: 91 MMHG | WEIGHT: 257 LBS | HEART RATE: 111 BPM | SYSTOLIC BLOOD PRESSURE: 125 MMHG | BODY MASS INDEX: 48.56 KG/M2

## 2021-06-15 DIAGNOSIS — E66.01 OBESITY, MORBID, BMI 40.0-49.9: ICD-10-CM

## 2021-06-15 DIAGNOSIS — Z01.419 WELL WOMAN EXAM WITH ROUTINE GYNECOLOGICAL EXAM: Primary | ICD-10-CM

## 2021-06-15 DIAGNOSIS — E03.9 HYPOTHYROIDISM, UNSPECIFIED TYPE: ICD-10-CM

## 2021-06-15 PROCEDURE — 99395 PR PREVENTIVE VISIT,EST,18-39: ICD-10-PCS | Mod: S$GLB,,, | Performed by: OBSTETRICS & GYNECOLOGY

## 2021-06-15 PROCEDURE — 99395 PREV VISIT EST AGE 18-39: CPT | Mod: S$GLB,,, | Performed by: OBSTETRICS & GYNECOLOGY

## 2021-06-18 ENCOUNTER — PATIENT MESSAGE (OUTPATIENT)
Dept: OBSTETRICS AND GYNECOLOGY | Facility: CLINIC | Age: 26
End: 2021-06-18

## 2021-06-20 LAB
ABS NRBC COUNT: 0 X 10 3/UL (ref 0–0.01)
ABSOLUTE BASOPHIL: 0.02 X 10 3/UL (ref 0–0.22)
ABSOLUTE EOSINOPHIL: 0.24 X 10 3/UL (ref 0.04–0.54)
ABSOLUTE IMMATURE GRAN: 0.02 X 10 3/UL (ref 0–0.04)
ABSOLUTE LYMPHOCYTE: 3.15 X 10 3/UL (ref 0.86–4.75)
ABSOLUTE MONOCYTE: 0.59 X 10 3/UL (ref 0.22–1.08)
ALBUMIN SERPL-MCNC: 3.9 G/DL (ref 3.5–5.2)
ALBUMIN/GLOB SERPL ELPH: 1.3 {RATIO} (ref 1–2.7)
ALP ISOS SERPL LEV INH-CCNC: 82 U/L (ref 35–105)
ALT (SGPT): 23 U/L (ref 0–33)
AMORPH URATE CRY URNS QL MICRO: NEGATIVE
ANION GAP SERPL CALC-SCNC: 9 MMOL/L (ref 8–17)
AST SERPL-CCNC: 16 U/L (ref 0–32)
BACTERIA #/AREA URNS HPF: ABNORMAL /[HPF]
BASOPHILS NFR BLD: 0.3 % (ref 0.2–1.2)
BILIRUB UR QL STRIP: NEGATIVE
BILIRUBIN, TOTAL: 0.27 MG/DL (ref 0–1.2)
BUN/CREAT SERPL: 19.4 (ref 6–20)
CALCIUM SERPL-MCNC: 9.1 MG/DL (ref 8.6–10.2)
CARBON DIOXIDE, CO2: 27 MMOL/L (ref 22–29)
CHLORIDE: 102 MMOL/L (ref 98–107)
CHOLEST SERPL-MSCNC: 154 MG/DL (ref 100–200)
CLARITY UR: ABNORMAL
COLOR UR: YELLOW
CREAT SERPL-MCNC: 0.36 MG/DL (ref 0.5–0.9)
EOSINOPHIL NFR BLD: 3.3 % (ref 0.7–7)
EPITHELIAL CELLS: ABNORMAL
ESTIMATED AVERAGE GLUCOSE: 134 MG/DL
GFR ESTIMATION: 217.89
GLOBULIN: 3.1 G/DL (ref 1.5–4.5)
GLUCOSE (UA): NEGATIVE MG/DL
GLUCOSE: 126 MG/DL (ref 74–106)
HBA1C MFR BLD: 6.3 % (ref 4–6)
HCT VFR BLD AUTO: 35.1 % (ref 37–47)
HDLC SERPL-MCNC: 52 MG/DL
HGB BLD-MCNC: 10.8 G/DL (ref 12–16)
IMMATURE GRANULOCYTES: 0.3 % (ref 0–0.5)
KETONES UR QL STRIP: NEGATIVE MG/DL
LDL/HDL RATIO: 1.6 (ref 1–3)
LDLC SERPL CALC-MCNC: 82.4 MG/DL (ref 0–100)
LEUKOCYTE ESTERASE UR QL STRIP: ABNORMAL
LYMPHOCYTES NFR BLD: 43.8 % (ref 19.3–53.1)
MCH RBC QN AUTO: 23.1 PG (ref 27–32)
MCHC RBC AUTO-ENTMCNC: 30.8 G/DL (ref 32–36)
MCV RBC AUTO: 75 FL (ref 82–100)
MONOCYTES NFR BLD: 8.2 % (ref 4.7–12.5)
MUCOUS THREADS URNS QL MICRO: ABNORMAL
NEUTROPHILS # BLD AUTO: 3.18 X 10 3/UL (ref 2.15–7.56)
NEUTROPHILS NFR BLD: 44.1 %
NITRITE UR QL STRIP: NEGATIVE
NUCLEATED RED BLOOD CELLS: 0 /100 WBC (ref 0–0.2)
OCCULT BLOOD: ABNORMAL
PH, URINE: 6 (ref 5–7.5)
PLATELET # BLD AUTO: 419 X 10 3/UL (ref 135–400)
POTASSIUM: 3.9 MMOL/L (ref 3.5–5.1)
PROT SNV-MCNC: 7 G/DL (ref 6.4–8.3)
PROT UR QL STRIP: 15 MG/DL
RBC # BLD AUTO: 4.68 X 10 6/UL (ref 4.2–5.4)
RBC/HPF: ABNORMAL
RDW-SD: 42.1 FL (ref 37–54)
SODIUM: 138 MMOL/L (ref 136–145)
SP GR UR STRIP: 1.01 (ref 1–1.03)
T4, FREE: 2.13 NG/DL (ref 0.93–1.7)
TRIGL SERPL-MCNC: 98 MG/DL (ref 0–150)
TSH SERPL DL<=0.005 MIU/L-ACNC: 0.01 UIU/ML (ref 0.27–4.2)
UREA NITROGEN (BUN): 7 MG/DL (ref 6–20)
URINE CULTURE, ROUTINE: NORMAL
UROBILINOGEN, URINE: NORMAL E.U./DL (ref 0–1)
VITAMIN D (25OHD): 12.8 NG/ML
WBC # BLD: 7.2 X 10 3/UL (ref 4.3–10.8)
WBC/HPF: ABNORMAL

## 2021-06-23 ENCOUNTER — TELEPHONE (OUTPATIENT)
Dept: PRIMARY CARE CLINIC | Facility: CLINIC | Age: 26
End: 2021-06-23

## 2021-06-23 DIAGNOSIS — N39.0 URINARY TRACT INFECTION WITH HEMATURIA, SITE UNSPECIFIED: Primary | ICD-10-CM

## 2021-06-23 DIAGNOSIS — R73.09 ELEVATED GLUCOSE LEVEL: ICD-10-CM

## 2021-06-23 DIAGNOSIS — E55.9 VITAMIN D DEFICIENCY: ICD-10-CM

## 2021-06-23 DIAGNOSIS — R31.9 URINARY TRACT INFECTION WITH HEMATURIA, SITE UNSPECIFIED: Primary | ICD-10-CM

## 2021-06-23 RX ORDER — ASPIRIN 325 MG
50000 TABLET, DELAYED RELEASE (ENTERIC COATED) ORAL WEEKLY
Qty: 4 CAPSULE | Refills: 11 | Status: SHIPPED | OUTPATIENT
Start: 2021-06-23 | End: 2021-10-16 | Stop reason: SDUPTHER

## 2021-06-23 RX ORDER — SULFAMETHOXAZOLE AND TRIMETHOPRIM 800; 160 MG/1; MG/1
1 TABLET ORAL 2 TIMES DAILY
Qty: 14 TABLET | Refills: 0 | Status: SHIPPED | OUTPATIENT
Start: 2021-06-23 | End: 2021-07-09

## 2021-06-23 RX ORDER — METFORMIN HYDROCHLORIDE 500 MG/1
500 TABLET, EXTENDED RELEASE ORAL NIGHTLY
Qty: 30 TABLET | Refills: 6 | Status: SHIPPED | OUTPATIENT
Start: 2021-06-23 | End: 2022-06-16

## 2021-07-09 ENCOUNTER — OFFICE VISIT (OUTPATIENT)
Dept: PRIMARY CARE CLINIC | Facility: CLINIC | Age: 26
End: 2021-07-09
Payer: MEDICAID

## 2021-07-09 ENCOUNTER — PROCEDURE VISIT (OUTPATIENT)
Dept: OBSTETRICS AND GYNECOLOGY | Facility: CLINIC | Age: 26
End: 2021-07-09
Payer: MEDICAID

## 2021-07-09 VITALS
BODY MASS INDEX: 49.13 KG/M2 | DIASTOLIC BLOOD PRESSURE: 83 MMHG | HEART RATE: 98 BPM | WEIGHT: 260 LBS | SYSTOLIC BLOOD PRESSURE: 123 MMHG

## 2021-07-09 VITALS
HEIGHT: 61 IN | WEIGHT: 263 LBS | OXYGEN SATURATION: 97 % | TEMPERATURE: 97 F | HEART RATE: 84 BPM | BODY MASS INDEX: 49.65 KG/M2 | DIASTOLIC BLOOD PRESSURE: 72 MMHG | RESPIRATION RATE: 14 BRPM | SYSTOLIC BLOOD PRESSURE: 112 MMHG

## 2021-07-09 DIAGNOSIS — R87.610 ASCUS WITH POSITIVE HIGH RISK HPV CERVICAL: Primary | ICD-10-CM

## 2021-07-09 DIAGNOSIS — E05.90 HYPERTHYROIDISM: Primary | ICD-10-CM

## 2021-07-09 DIAGNOSIS — R87.810 ASCUS WITH POSITIVE HIGH RISK HPV CERVICAL: Primary | ICD-10-CM

## 2021-07-09 DIAGNOSIS — E66.01 CLASS 3 SEVERE OBESITY WITH BODY MASS INDEX (BMI) OF 45.0 TO 49.9 IN ADULT, UNSPECIFIED OBESITY TYPE, UNSPECIFIED WHETHER SERIOUS COMORBIDITY PRESENT: ICD-10-CM

## 2021-07-09 DIAGNOSIS — R73.03 PRE-DIABETES: ICD-10-CM

## 2021-07-09 PROCEDURE — 99203 OFFICE O/P NEW LOW 30 MIN: CPT | Mod: S$GLB,,, | Performed by: INTERNAL MEDICINE

## 2021-07-09 PROCEDURE — 57456 COLPOSCOPY: ICD-10-PCS | Mod: S$GLB,,, | Performed by: OBSTETRICS & GYNECOLOGY

## 2021-07-09 PROCEDURE — 81025 PR  URINE PREGNANCY TEST: ICD-10-PCS | Mod: S$GLB,,, | Performed by: OBSTETRICS & GYNECOLOGY

## 2021-07-09 PROCEDURE — 99203 PR OFFICE/OUTPT VISIT, NEW, LEVL III, 30-44 MIN: ICD-10-PCS | Mod: S$GLB,,, | Performed by: INTERNAL MEDICINE

## 2021-07-09 PROCEDURE — 81025 URINE PREGNANCY TEST: CPT | Mod: S$GLB,,, | Performed by: OBSTETRICS & GYNECOLOGY

## 2021-07-09 PROCEDURE — 57456 ENDOCERV CURETTAGE W/SCOPE: CPT | Mod: S$GLB,,, | Performed by: OBSTETRICS & GYNECOLOGY

## 2021-07-09 RX ORDER — METHIMAZOLE 5 MG/1
5 TABLET ORAL 3 TIMES DAILY
Qty: 90 TABLET | Refills: 11 | Status: SHIPPED | OUTPATIENT
Start: 2021-07-09 | End: 2021-10-16 | Stop reason: SDUPTHER

## 2021-08-04 ENCOUNTER — TELEPHONE (OUTPATIENT)
Dept: PRIMARY CARE CLINIC | Facility: CLINIC | Age: 26
End: 2021-08-04

## 2021-08-23 ENCOUNTER — TELEPHONE (OUTPATIENT)
Dept: PRIMARY CARE CLINIC | Facility: CLINIC | Age: 26
End: 2021-08-23

## 2021-10-07 ENCOUNTER — PATIENT MESSAGE (OUTPATIENT)
Dept: OBSTETRICS AND GYNECOLOGY | Facility: CLINIC | Age: 26
End: 2021-10-07

## 2021-10-07 DIAGNOSIS — B37.9 YEAST INFECTION: ICD-10-CM

## 2021-10-07 DIAGNOSIS — B37.31 VAGINAL YEAST INFECTION: Primary | ICD-10-CM

## 2021-10-07 RX ORDER — NITROFURANTOIN 25; 75 MG/1; MG/1
100 CAPSULE ORAL 2 TIMES DAILY
Qty: 14 CAPSULE | Refills: 0 | Status: CANCELLED | OUTPATIENT
Start: 2021-10-07 | End: 2021-10-14

## 2021-10-07 RX ORDER — FLUCONAZOLE 150 MG/1
150 TABLET ORAL EVERY OTHER DAY
Qty: 2 TABLET | Refills: 2 | Status: SHIPPED | OUTPATIENT
Start: 2021-10-07 | End: 2021-10-10

## 2021-10-18 ENCOUNTER — OFFICE VISIT (OUTPATIENT)
Dept: PRIMARY CARE CLINIC | Facility: CLINIC | Age: 26
End: 2021-10-18
Payer: MEDICAID

## 2021-10-18 VITALS
SYSTOLIC BLOOD PRESSURE: 141 MMHG | HEIGHT: 61 IN | BODY MASS INDEX: 52.11 KG/M2 | RESPIRATION RATE: 16 BRPM | OXYGEN SATURATION: 98 % | TEMPERATURE: 98 F | HEART RATE: 108 BPM | WEIGHT: 276 LBS | DIASTOLIC BLOOD PRESSURE: 87 MMHG

## 2021-10-18 DIAGNOSIS — E05.90 HYPERTHYROIDISM: ICD-10-CM

## 2021-10-18 DIAGNOSIS — R06.2 WHEEZING: Primary | ICD-10-CM

## 2021-10-18 DIAGNOSIS — E05.20 TOXIC MULTINODULAR GOITER: ICD-10-CM

## 2021-10-18 DIAGNOSIS — K21.9 GASTROESOPHAGEAL REFLUX DISEASE, UNSPECIFIED WHETHER ESOPHAGITIS PRESENT: ICD-10-CM

## 2021-10-18 DIAGNOSIS — E66.01 CLASS 3 SEVERE OBESITY WITH BODY MASS INDEX (BMI) OF 45.0 TO 49.9 IN ADULT, UNSPECIFIED OBESITY TYPE, UNSPECIFIED WHETHER SERIOUS COMORBIDITY PRESENT: ICD-10-CM

## 2021-10-18 PROCEDURE — 99214 PR OFFICE/OUTPT VISIT, EST, LEVL IV, 30-39 MIN: ICD-10-PCS | Mod: S$GLB,,, | Performed by: INTERNAL MEDICINE

## 2021-10-18 PROCEDURE — 99214 OFFICE O/P EST MOD 30 MIN: CPT | Mod: S$GLB,,, | Performed by: INTERNAL MEDICINE

## 2021-10-18 RX ORDER — ALBUTEROL SULFATE 90 UG/1
2 AEROSOL, METERED RESPIRATORY (INHALATION) EVERY 6 HOURS PRN
Qty: 18 G | Refills: 0 | Status: SHIPPED | OUTPATIENT
Start: 2021-10-18 | End: 2022-06-16

## 2021-10-18 RX ORDER — OMEPRAZOLE 40 MG/1
40 CAPSULE, DELAYED RELEASE ORAL DAILY
Qty: 30 CAPSULE | Refills: 2 | Status: SHIPPED | OUTPATIENT
Start: 2021-10-18 | End: 2023-01-24

## 2021-10-19 LAB
T3FREE SERPL-MCNC: 6.7 PG/ML (ref 2.3–4.2)
T4 FREE SERPL-MCNC: 2.2 NG/DL (ref 0.8–1.8)
TSH SERPL-ACNC: <0.01 MIU/L

## 2021-11-05 DIAGNOSIS — E05.90 HYPERTHYROIDISM: Primary | ICD-10-CM

## 2021-11-05 RX ORDER — METHIMAZOLE 5 MG/1
5 TABLET ORAL 2 TIMES DAILY
Qty: 60 TABLET | Refills: 0 | Status: SHIPPED | OUTPATIENT
Start: 2021-11-05 | End: 2023-01-24

## 2021-11-05 RX ORDER — ATENOLOL 50 MG/1
50 TABLET ORAL DAILY
Qty: 30 TABLET | Refills: 11 | Status: SHIPPED | OUTPATIENT
Start: 2021-11-05 | End: 2022-06-16

## 2021-11-10 ENCOUNTER — TELEPHONE (OUTPATIENT)
Dept: PRIMARY CARE CLINIC | Facility: CLINIC | Age: 26
End: 2021-11-10
Payer: MEDICAID

## 2021-11-24 ENCOUNTER — PATIENT MESSAGE (OUTPATIENT)
Dept: PRIMARY CARE CLINIC | Facility: CLINIC | Age: 26
End: 2021-11-24
Payer: MEDICAID

## 2022-06-16 ENCOUNTER — OFFICE VISIT (OUTPATIENT)
Dept: OBSTETRICS AND GYNECOLOGY | Facility: CLINIC | Age: 27
End: 2022-06-16
Payer: MEDICAID

## 2022-06-16 VITALS
SYSTOLIC BLOOD PRESSURE: 141 MMHG | WEIGHT: 269.63 LBS | BODY MASS INDEX: 50.91 KG/M2 | DIASTOLIC BLOOD PRESSURE: 99 MMHG | HEIGHT: 61 IN | HEART RATE: 125 BPM

## 2022-06-16 DIAGNOSIS — Z01.419 WELL WOMAN EXAM WITH ROUTINE GYNECOLOGICAL EXAM: Primary | ICD-10-CM

## 2022-06-16 DIAGNOSIS — Z11.3 SCREENING EXAMINATION FOR SEXUALLY TRANSMITTED DISEASE: ICD-10-CM

## 2022-06-16 PROCEDURE — 1159F MED LIST DOCD IN RCRD: CPT | Mod: CPTII,S$GLB,, | Performed by: OBSTETRICS & GYNECOLOGY

## 2022-06-16 PROCEDURE — 3008F BODY MASS INDEX DOCD: CPT | Mod: CPTII,S$GLB,, | Performed by: OBSTETRICS & GYNECOLOGY

## 2022-06-16 PROCEDURE — 1159F PR MEDICATION LIST DOCUMENTED IN MEDICAL RECORD: ICD-10-PCS | Mod: CPTII,S$GLB,, | Performed by: OBSTETRICS & GYNECOLOGY

## 2022-06-16 PROCEDURE — 3008F PR BODY MASS INDEX (BMI) DOCUMENTED: ICD-10-PCS | Mod: CPTII,S$GLB,, | Performed by: OBSTETRICS & GYNECOLOGY

## 2022-06-16 PROCEDURE — 3080F DIAST BP >= 90 MM HG: CPT | Mod: CPTII,S$GLB,, | Performed by: OBSTETRICS & GYNECOLOGY

## 2022-06-16 PROCEDURE — 3077F SYST BP >= 140 MM HG: CPT | Mod: CPTII,S$GLB,, | Performed by: OBSTETRICS & GYNECOLOGY

## 2022-06-16 PROCEDURE — 3077F PR MOST RECENT SYSTOLIC BLOOD PRESSURE >= 140 MM HG: ICD-10-PCS | Mod: CPTII,S$GLB,, | Performed by: OBSTETRICS & GYNECOLOGY

## 2022-06-16 PROCEDURE — 3080F PR MOST RECENT DIASTOLIC BLOOD PRESSURE >= 90 MM HG: ICD-10-PCS | Mod: CPTII,S$GLB,, | Performed by: OBSTETRICS & GYNECOLOGY

## 2022-06-16 PROCEDURE — 99395 PR PREVENTIVE VISIT,EST,18-39: ICD-10-PCS | Mod: S$GLB,,, | Performed by: OBSTETRICS & GYNECOLOGY

## 2022-06-16 PROCEDURE — 99395 PREV VISIT EST AGE 18-39: CPT | Mod: S$GLB,,, | Performed by: OBSTETRICS & GYNECOLOGY

## 2022-06-16 RX ORDER — CETIRIZINE HYDROCHLORIDE 10 MG/1
10 TABLET ORAL DAILY
COMMUNITY
Start: 2022-06-02 | End: 2023-01-24

## 2022-06-16 NOTE — PROGRESS NOTES
Subjective:       Patient ID: Robe Lopez is a 26 y.o. female.    Chief Complaint:  Well Woman      History of Present Illness  She is doing well.  No new health issues,  No abnormal bleeding, No GI or Gu concerns,  No dyspareunia  Reg menses  She is not on birth control    With draw method.   She had her thyroid burned out with radioactive pill.   She is on meds now. Same partner.   .   HPI      GYN & OB History  Patient's last menstrual period was 2022 (approximate).     Date of Last Pap: No result found    OB History    Para Term  AB Living   1 0       0   SAB IAB Ectopic Multiple Live Births                  # Outcome Date GA Lbr Kiel/2nd Weight Sex Delivery Anes PTL Lv   1                 Review of Systems  Review of Systems   Constitutional: Negative for activity change.   Eyes: Negative for visual disturbance.   Respiratory: Negative for shortness of breath.    Cardiovascular: Negative for chest pain.   Gastrointestinal: Negative for abdominal pain.   Genitourinary: Negative for vaginal bleeding.        No abnormal vaginal bleeding   Musculoskeletal: Negative for back pain.   Integumentary:  Negative for rash and breast mass.   Neurological: Negative for numbness.   Psychiatric/Behavioral:        No mood disturbance or changes    Breast: Negative for mass            Objective:    Physical Exam:   Constitutional: She is oriented to person, place, and time. She appears well-developed. She is cooperative.    HENT:   Head: Normocephalic.     Neck: Trachea normal. No thyromegaly present.    Cardiovascular: Normal rate, regular rhythm and normal heart sounds.     Pulmonary/Chest: Effort normal and breath sounds normal. Right breast exhibits no mass, no nipple discharge and no skin change. Left breast exhibits no mass, no nipple discharge and no skin change.        Abdominal: Soft. There is no abdominal tenderness. There is no rebound and no guarding.     Genitourinary:    Vagina and  uterus normal.      Pelvic exam was performed with patient supine.   Labial bartholins normal.There is no lesion on the right labia. There is no lesion on the left labia. Cervix is normal. Right adnexum displays no mass and no tenderness. Left adnexum displays no mass and no tenderness. Cervix exhibits no discharge. Also,  pap smear completed  Uterus is not enlarged and not tender.              Lymphadenopathy:        Head (right side): No submental and no submandibular adenopathy present.        Head (left side): No submental and no submandibular adenopathy present.     She has no cervical adenopathy.    Neurological: She is alert and oriented to person, place, and time.    Skin: Skin is warm.    Psychiatric: She has a normal mood and affect. Her speech is normal and behavior is normal. Thought content normal.          Assessment:        1. Well woman exam with routine gynecological exam                 Plan:             Pap discussed will return for her annual     Pt is aware we call all results. If she does not hear from our office regarding her result within a week of having a study or procedure performed she is to call the office so that we can research the result for her.  Birth control discussed  Chaperone was present

## 2022-06-21 LAB
CHLAMYDIA: NEGATIVE
GONORRHEA: NEGATIVE
SOURCE: NORMAL

## 2022-06-27 RX ORDER — TINIDAZOLE 500 MG/1
2 TABLET ORAL
Qty: 8 TABLET | Refills: 0 | Status: SHIPPED | OUTPATIENT
Start: 2022-06-27 | End: 2022-06-29

## 2022-08-24 ENCOUNTER — PATIENT OUTREACH (OUTPATIENT)
Dept: ADMINISTRATIVE | Facility: HOSPITAL | Age: 27
End: 2022-08-24
Payer: MEDICAID

## 2022-09-02 DIAGNOSIS — B37.31 VAGINAL YEAST INFECTION: Primary | ICD-10-CM

## 2022-09-02 NOTE — TELEPHONE ENCOUNTER
----- Message from Cynthia Hernandez sent at 9/2/2022 11:09 AM CDT -----  Contact: Patient  Patient called to consult with nurse or staff regarding a prescription she thought she had a refill on. She would like to speak with the office regarding this and would like a call back. She can be reached at [phone]. Thanks/MR

## 2022-09-06 RX ORDER — FLUCONAZOLE 150 MG/1
150 TABLET ORAL EVERY OTHER DAY
Qty: 2 TABLET | Refills: 2 | Status: SHIPPED | OUTPATIENT
Start: 2022-09-06 | End: 2022-09-09

## 2022-11-09 ENCOUNTER — PATIENT MESSAGE (OUTPATIENT)
Dept: FAMILY MEDICINE | Facility: CLINIC | Age: 27
End: 2022-11-09
Payer: MEDICAID

## 2023-01-24 ENCOUNTER — OFFICE VISIT (OUTPATIENT)
Dept: OBSTETRICS AND GYNECOLOGY | Facility: CLINIC | Age: 28
End: 2023-01-24
Payer: MEDICAID

## 2023-01-24 VITALS
DIASTOLIC BLOOD PRESSURE: 101 MMHG | WEIGHT: 279 LBS | BODY MASS INDEX: 52.72 KG/M2 | SYSTOLIC BLOOD PRESSURE: 158 MMHG | HEART RATE: 105 BPM

## 2023-01-24 DIAGNOSIS — N76.0 ACUTE VAGINITIS: Primary | ICD-10-CM

## 2023-01-24 PROCEDURE — 3077F SYST BP >= 140 MM HG: CPT | Mod: CPTII,S$GLB,, | Performed by: OBSTETRICS & GYNECOLOGY

## 2023-01-24 PROCEDURE — 3008F PR BODY MASS INDEX (BMI) DOCUMENTED: ICD-10-PCS | Mod: CPTII,S$GLB,, | Performed by: OBSTETRICS & GYNECOLOGY

## 2023-01-24 PROCEDURE — 1159F PR MEDICATION LIST DOCUMENTED IN MEDICAL RECORD: ICD-10-PCS | Mod: CPTII,S$GLB,, | Performed by: OBSTETRICS & GYNECOLOGY

## 2023-01-24 PROCEDURE — 3080F PR MOST RECENT DIASTOLIC BLOOD PRESSURE >= 90 MM HG: ICD-10-PCS | Mod: CPTII,S$GLB,, | Performed by: OBSTETRICS & GYNECOLOGY

## 2023-01-24 PROCEDURE — 99213 PR OFFICE/OUTPT VISIT, EST, LEVL III, 20-29 MIN: ICD-10-PCS | Mod: S$GLB,,, | Performed by: OBSTETRICS & GYNECOLOGY

## 2023-01-24 PROCEDURE — 99213 OFFICE O/P EST LOW 20 MIN: CPT | Mod: S$GLB,,, | Performed by: OBSTETRICS & GYNECOLOGY

## 2023-01-24 PROCEDURE — 3077F PR MOST RECENT SYSTOLIC BLOOD PRESSURE >= 140 MM HG: ICD-10-PCS | Mod: CPTII,S$GLB,, | Performed by: OBSTETRICS & GYNECOLOGY

## 2023-01-24 PROCEDURE — 3008F BODY MASS INDEX DOCD: CPT | Mod: CPTII,S$GLB,, | Performed by: OBSTETRICS & GYNECOLOGY

## 2023-01-24 PROCEDURE — 1159F MED LIST DOCD IN RCRD: CPT | Mod: CPTII,S$GLB,, | Performed by: OBSTETRICS & GYNECOLOGY

## 2023-01-24 PROCEDURE — 3080F DIAST BP >= 90 MM HG: CPT | Mod: CPTII,S$GLB,, | Performed by: OBSTETRICS & GYNECOLOGY

## 2023-01-24 RX ORDER — FLUCONAZOLE 150 MG/1
150 TABLET ORAL DAILY
Qty: 3 TABLET | Refills: 3 | Status: SHIPPED | OUTPATIENT
Start: 2023-01-24 | End: 2023-01-26 | Stop reason: SDUPTHER

## 2023-01-24 NOTE — PROGRESS NOTES
Subjective:       Patient ID: Robe Lopez is a 27 y.o. female.    Chief Complaint:  Vaginitis (PATIENT C/O RECURRENT YEAST INFECTIONS. SHE STATES SYMPTOMS STARTED BEFORE GENA. SHE STATES SHE USED BORIC ACID AND IT CLEARED UP BUT CAME BACK SHORTLY AFTER. SHE TOOK ANOTHER PRESCRIPTION THAT SHE STATES ALSO HELPED BUT SHORTLY AFTER SYMPTOMS RETURNED. )      History of Present Illness  She is here to evaluate . Recurrent yeats   she had benefits with BAS and diflucan but returned         GYN & OB History  Patient's last menstrual period was 01/10/2023.     Date of Last Pap: No result found    OB History    Para Term  AB Living   1 0       0   SAB IAB Ectopic Multiple Live Births                  # Outcome Date GA Lbr Kiel/2nd Weight Sex Delivery Anes PTL Lv   1                 Review of Systems  Review of Systems          Objective:    Physical Exam     Vulva looks normal but min edema on minora.  Vaginal D/C  cx preformed   neg bimanual    Assessment:        1. Acute vaginitis                 Plan:         Diflucan and gynazol       Pap discussed will return for her annual     Pt is aware we call all results. If she does not hear from our office regarding her result within a week of having a study or procedure performed she is to call the office so that we can research the result for her.  Discussed follow up.  She is to rtn if her symptoms do not resolve or if persist  Chaperone was present   Answers submitted by the patient for this visit:  Gynecologic Exam Questionnaire  (Submitted on 2023)  Chief Complaint: Gynecologic exam  genital itching: Yes  genital lesions: No  genital odor: No  genital rash: Yes  missed menses: No  pelvic pain: No  vaginal bleeding: Yes  vaginal discharge: Yes  Chronicity: recurrent  Onset: 1 to 4 weeks ago  Frequency: daily  Progression since onset: gradually worsening  Pain severity: no pain  Affected side: both  Pregnant now?: No  abdominal pain:  No  anorexia: No  back pain: No  chills: No  constipation: No  diarrhea: No  discolored urine: No  dysuria: No  fever: No  flank pain: No  frequency: No  headaches: No  hematuria: No  nausea: No  painful intercourse: No  rash: Yes  urgency: No  vomiting: No  Vaginal bleeding: spotting  Passing clots?: No  Passing tissue?: No  Aggravated by: tactile pressure  treatments tried: anti-fungal cream, warm bath  Improvement on treatment: mild  Sexual activity: sexually active  Partner with STD symptoms: no  Menstrual history: regular  STD: Yes   section: Yes  Endometriosis: Yes  metrorrhagia: Yes

## 2023-01-26 DIAGNOSIS — N76.0 ACUTE VAGINITIS: ICD-10-CM

## 2023-01-26 LAB — CULTURE: NORMAL

## 2023-01-26 RX ORDER — FLUCONAZOLE 150 MG/1
150 TABLET ORAL DAILY
Qty: 3 TABLET | Refills: 3 | Status: SHIPPED | OUTPATIENT
Start: 2023-01-26 | End: 2024-02-19

## 2023-01-26 RX ORDER — AZITHROMYCIN 250 MG/1
TABLET, FILM COATED ORAL
Qty: 2 TABLET | Refills: 0 | Status: SHIPPED | OUTPATIENT
Start: 2023-01-26 | End: 2023-01-31

## 2023-02-08 ENCOUNTER — PATIENT OUTREACH (OUTPATIENT)
Dept: ADMINISTRATIVE | Facility: HOSPITAL | Age: 28
End: 2023-02-08
Payer: MEDICAID

## 2023-04-11 ENCOUNTER — PATIENT MESSAGE (OUTPATIENT)
Dept: RESEARCH | Facility: HOSPITAL | Age: 28
End: 2023-04-11
Payer: MEDICAID

## 2023-05-29 ENCOUNTER — PATIENT MESSAGE (OUTPATIENT)
Dept: ADMINISTRATIVE | Facility: HOSPITAL | Age: 28
End: 2023-05-29
Payer: MEDICAID

## 2023-06-19 ENCOUNTER — OFFICE VISIT (OUTPATIENT)
Dept: OBSTETRICS AND GYNECOLOGY | Facility: CLINIC | Age: 28
End: 2023-06-19
Payer: MEDICAID

## 2023-06-19 VITALS
SYSTOLIC BLOOD PRESSURE: 132 MMHG | WEIGHT: 284 LBS | DIASTOLIC BLOOD PRESSURE: 92 MMHG | BODY MASS INDEX: 53.66 KG/M2 | HEART RATE: 62 BPM

## 2023-06-19 DIAGNOSIS — Z01.419 WELL WOMAN EXAM WITH ROUTINE GYNECOLOGICAL EXAM: Primary | ICD-10-CM

## 2023-06-19 DIAGNOSIS — R63.5 WEIGHT GAIN: ICD-10-CM

## 2023-06-19 PROCEDURE — 3075F PR MOST RECENT SYSTOLIC BLOOD PRESS GE 130-139MM HG: ICD-10-PCS | Mod: CPTII,S$GLB,, | Performed by: OBSTETRICS & GYNECOLOGY

## 2023-06-19 PROCEDURE — 3075F SYST BP GE 130 - 139MM HG: CPT | Mod: CPTII,S$GLB,, | Performed by: OBSTETRICS & GYNECOLOGY

## 2023-06-19 PROCEDURE — 3080F PR MOST RECENT DIASTOLIC BLOOD PRESSURE >= 90 MM HG: ICD-10-PCS | Mod: CPTII,S$GLB,, | Performed by: OBSTETRICS & GYNECOLOGY

## 2023-06-19 PROCEDURE — 99395 PR PREVENTIVE VISIT,EST,18-39: ICD-10-PCS | Mod: S$GLB,,, | Performed by: OBSTETRICS & GYNECOLOGY

## 2023-06-19 PROCEDURE — 1159F MED LIST DOCD IN RCRD: CPT | Mod: CPTII,S$GLB,, | Performed by: OBSTETRICS & GYNECOLOGY

## 2023-06-19 PROCEDURE — 3080F DIAST BP >= 90 MM HG: CPT | Mod: CPTII,S$GLB,, | Performed by: OBSTETRICS & GYNECOLOGY

## 2023-06-19 PROCEDURE — 3008F BODY MASS INDEX DOCD: CPT | Mod: CPTII,S$GLB,, | Performed by: OBSTETRICS & GYNECOLOGY

## 2023-06-19 PROCEDURE — 1159F PR MEDICATION LIST DOCUMENTED IN MEDICAL RECORD: ICD-10-PCS | Mod: CPTII,S$GLB,, | Performed by: OBSTETRICS & GYNECOLOGY

## 2023-06-19 PROCEDURE — 3008F PR BODY MASS INDEX (BMI) DOCUMENTED: ICD-10-PCS | Mod: CPTII,S$GLB,, | Performed by: OBSTETRICS & GYNECOLOGY

## 2023-06-19 PROCEDURE — 99395 PREV VISIT EST AGE 18-39: CPT | Mod: S$GLB,,, | Performed by: OBSTETRICS & GYNECOLOGY

## 2023-06-19 RX ORDER — METFORMIN HYDROCHLORIDE 500 MG/1
500 TABLET ORAL 2 TIMES DAILY
COMMUNITY
Start: 2023-04-13

## 2023-06-19 RX ORDER — SEMAGLUTIDE 1.34 MG/ML
INJECTION, SOLUTION SUBCUTANEOUS
COMMUNITY
Start: 2023-06-02 | End: 2024-01-03

## 2023-06-19 RX ORDER — PHENTERMINE AND TOPIRAMATE 7.5; 46 MG/1; MG/1
1 CAPSULE, EXTENDED RELEASE ORAL EVERY MORNING
Qty: 30 CAPSULE | Refills: 5 | Status: SHIPPED | OUTPATIENT
Start: 2023-06-19 | End: 2023-07-19

## 2023-06-19 RX ORDER — PHENTERMINE AND TOPIRAMATE 3.75; 23 MG/1; MG/1
1 CAPSULE, EXTENDED RELEASE ORAL DAILY
Qty: 14 CAPSULE | Refills: 0 | Status: SHIPPED | OUTPATIENT
Start: 2023-06-19 | End: 2023-07-03

## 2023-06-19 NOTE — PROGRESS NOTES
Subjective:       Patient ID: Robe Lopez is a 27 y.o. female.    Chief Complaint:  Well Woman      History of Present Illness  She is doing well.  No new health issues,  No abnormal bleeding, No GI or Gu concerns,  No dyspareunia  . Reg cycles  she has noted weight gain and one delayed cycle.   Her thyroid  was normal 2 months ago.  Same partner.   No birth control    HPI      GYN & OB History  Patient's last menstrual period was 2023.     Date of Last Pap: No result found    OB History    Para Term  AB Living   1 0       0   SAB IAB Ectopic Multiple Live Births                  # Outcome Date GA Lbr Kiel/2nd Weight Sex Delivery Anes PTL Lv   1                 Review of Systems  Review of Systems   Constitutional:  Negative for activity change.   Eyes:  Negative for visual disturbance.   Respiratory:  Negative for shortness of breath.    Cardiovascular:  Negative for chest pain.   Gastrointestinal:  Negative for abdominal pain.   Genitourinary:  Negative for vaginal bleeding.        No abnormal vaginal bleeding   Musculoskeletal:  Negative for back pain.   Integumentary:  Negative for rash and breast mass.   Neurological:  Negative for numbness.   Psychiatric/Behavioral:          No mood disturbance or changes    Breast: Negative for mass          Objective:    Physical Exam:   Constitutional: She is oriented to person, place, and time. She appears well-developed. She is cooperative.    HENT:   Head: Normocephalic.     Neck: Trachea normal. No thyromegaly present.    Cardiovascular:  Normal rate, regular rhythm and normal heart sounds.             Pulmonary/Chest: Effort normal and breath sounds normal. Right breast exhibits no mass, no nipple discharge and no skin change. Left breast exhibits no mass, no nipple discharge and no skin change.        Abdominal: Soft. There is no abdominal tenderness. There is no rebound and no guarding.     Genitourinary:    Vagina and uterus normal.       Pelvic exam was performed with patient supine.   Labial bartholins normal.There is no lesion on the right labia. There is no lesion on the left labia. Cervix is normal. Right adnexum displays no mass and no tenderness. Left adnexum displays no mass and no tenderness. Cervix exhibits no discharge. Uterus is not enlarged and not tender.              Lymphadenopathy:        Head (right side): No submental and no submandibular adenopathy present.        Head (left side): No submental and no submandibular adenopathy present.     She has no cervical adenopathy.    Neurological: She is alert and oriented to person, place, and time.    Skin: Skin is warm.    Psychiatric: She has a normal mood and affect. Her speech is normal and behavior is normal. Thought content normal.        Assessment:        1. Well woman exam with routine gynecological exam                 Plan:             Pap not preformed discussed will return for her annual     Pt is aware we call all results. If she does not hear from our office regarding her result within a week of having a study or procedure performed she is to call the office so that we can research the result for her.  Birth control discussed  Chaperone was present

## 2023-10-12 ENCOUNTER — OFFICE VISIT (OUTPATIENT)
Dept: PRIMARY CARE CLINIC | Facility: CLINIC | Age: 28
End: 2023-10-12
Payer: MEDICAID

## 2023-10-12 VITALS
DIASTOLIC BLOOD PRESSURE: 94 MMHG | HEIGHT: 61 IN | WEIGHT: 289.81 LBS | BODY MASS INDEX: 54.72 KG/M2 | OXYGEN SATURATION: 99 % | HEART RATE: 99 BPM | SYSTOLIC BLOOD PRESSURE: 138 MMHG

## 2023-10-12 DIAGNOSIS — E05.90 HYPERTHYROIDISM: ICD-10-CM

## 2023-10-12 DIAGNOSIS — E66.01 CLASS 3 SEVERE OBESITY WITH BODY MASS INDEX (BMI) OF 45.0 TO 49.9 IN ADULT, UNSPECIFIED OBESITY TYPE, UNSPECIFIED WHETHER SERIOUS COMORBIDITY PRESENT: Primary | ICD-10-CM

## 2023-10-12 PROCEDURE — 1159F MED LIST DOCD IN RCRD: CPT | Mod: CPTII,S$GLB,, | Performed by: INTERNAL MEDICINE

## 2023-10-12 PROCEDURE — 3075F PR MOST RECENT SYSTOLIC BLOOD PRESS GE 130-139MM HG: ICD-10-PCS | Mod: CPTII,S$GLB,, | Performed by: INTERNAL MEDICINE

## 2023-10-12 PROCEDURE — 3008F BODY MASS INDEX DOCD: CPT | Mod: CPTII,S$GLB,, | Performed by: INTERNAL MEDICINE

## 2023-10-12 PROCEDURE — 3080F PR MOST RECENT DIASTOLIC BLOOD PRESSURE >= 90 MM HG: ICD-10-PCS | Mod: CPTII,S$GLB,, | Performed by: INTERNAL MEDICINE

## 2023-10-12 PROCEDURE — 1159F PR MEDICATION LIST DOCUMENTED IN MEDICAL RECORD: ICD-10-PCS | Mod: CPTII,S$GLB,, | Performed by: INTERNAL MEDICINE

## 2023-10-12 PROCEDURE — 3075F SYST BP GE 130 - 139MM HG: CPT | Mod: CPTII,S$GLB,, | Performed by: INTERNAL MEDICINE

## 2023-10-12 PROCEDURE — 3008F PR BODY MASS INDEX (BMI) DOCUMENTED: ICD-10-PCS | Mod: CPTII,S$GLB,, | Performed by: INTERNAL MEDICINE

## 2023-10-12 PROCEDURE — 99213 OFFICE O/P EST LOW 20 MIN: CPT | Mod: S$GLB,,, | Performed by: INTERNAL MEDICINE

## 2023-10-12 PROCEDURE — 3080F DIAST BP >= 90 MM HG: CPT | Mod: CPTII,S$GLB,, | Performed by: INTERNAL MEDICINE

## 2023-10-12 PROCEDURE — 99213 PR OFFICE/OUTPT VISIT, EST, LEVL III, 20-29 MIN: ICD-10-PCS | Mod: S$GLB,,, | Performed by: INTERNAL MEDICINE

## 2023-10-12 NOTE — PROGRESS NOTES
Subjective:      Patient ID: Robe Lopez is a 27 y.o. female.    Chief Complaint: Annual Exam (She is seeing Dr Del Rio )    HPI      Patient here for annual exam. She is established with Endocrinologist Dr Del Rio and completed CELAYA and no longer needs to be on medication. She states she recently had an appintment and her thyrpid levels were normal   She is on ozempic presumably for weight loss which she started in March but has not really lost much weight on it so she has a consultation with Dr Otero/bariatric surgeon         Review of Systems   Constitutional:  Negative for chills and fever.   HENT:  Negative for hearing loss.    Eyes:  Negative for blurred vision.   Respiratory:  Negative for cough, shortness of breath and wheezing.    Cardiovascular:  Negative for chest pain, palpitations and leg swelling.   Gastrointestinal:  Negative for abdominal pain, blood in stool, constipation, diarrhea, melena, nausea and vomiting.   Genitourinary:  Negative for dysuria, frequency and urgency.   Musculoskeletal:  Negative for falls.   Skin:  Negative for rash.   Neurological:  Negative for dizziness and headaches.   Endo/Heme/Allergies:  Does not bruise/bleed easily.   Psychiatric/Behavioral:  Negative for depression. The patient is not nervous/anxious.      Objective:     Physical Exam  Vitals reviewed.   Constitutional:       Appearance: Normal appearance.   HENT:      Head: Normocephalic.      Mouth/Throat:      Mouth: Mucous membranes are moist.      Pharynx: Oropharynx is clear.   Eyes:      Extraocular Movements: Extraocular movements intact.      Conjunctiva/sclera: Conjunctivae normal.      Pupils: Pupils are equal, round, and reactive to light.   Cardiovascular:      Rate and Rhythm: Normal rate and regular rhythm.   Pulmonary:      Effort: Pulmonary effort is normal.      Breath sounds: Normal breath sounds.   Abdominal:      General: Bowel sounds are normal.   Musculoskeletal:      Right lower leg: No edema.  "     Left lower leg: No edema.   Skin:     General: Skin is warm.      Capillary Refill: Capillary refill takes less than 2 seconds.   Neurological:      General: No focal deficit present.      Mental Status: She is alert.   Psychiatric:         Mood and Affect: Mood normal.       BP (!) 138/94 (BP Location: Left arm, Patient Position: Sitting, BP Method: X-Large (Automatic))   Pulse 99   Ht 5' 1" (1.549 m)   Wt 131.5 kg (289 lb 12.8 oz)   SpO2 99%   BMI 54.76 kg/m² LMP Sept 4th     Assessment:       ICD-10-CM ICD-9-CM   1. Class 3 severe obesity with body mass index (BMI) of 45.0 to 49.9 in adult, unspecified obesity type, unspecified whether serious comorbidity present  E66.01 278.01    Z68.42 V85.42   2. Hyperthyroidism  E05.90 242.90       Plan:     Medication List with Changes/Refills   Current Medications    METFORMIN (GLUCOPHAGE) 500 MG TABLET    Take 500 mg by mouth 2 (two) times daily.    OZEMPIC 1 MG/DOSE (4 MG/3 ML)    INJECT 1MG SUBCUTANEOUSLY ONCE A WEEK        1. Class 3 severe obesity with body mass index (BMI) of 45.0 to 49.9 in adult, unspecified obesity type, unspecified whether serious comorbidity present    2. Hyperthyroidism       Will get records from Dr Del Rio office. Call back for appointment if needed for pre op evaluation      Future Appointments   Date Time Provider Department Center   6/24/2024  3:00 PM Ramon Rust III, MD [id number]  [facility]                      "

## 2023-11-24 ENCOUNTER — PATIENT MESSAGE (OUTPATIENT)
Dept: OBSTETRICS AND GYNECOLOGY | Facility: CLINIC | Age: 28
End: 2023-11-24
Payer: MEDICAID

## 2023-11-27 DIAGNOSIS — R10.2 PELVIC PAIN: Primary | ICD-10-CM

## 2023-11-28 ENCOUNTER — PROCEDURE VISIT (OUTPATIENT)
Dept: OBSTETRICS AND GYNECOLOGY | Facility: CLINIC | Age: 28
End: 2023-11-28
Payer: MEDICAID

## 2023-11-28 DIAGNOSIS — R10.2 PELVIC PAIN: ICD-10-CM

## 2023-11-28 PROCEDURE — 76830 US OB/GYN PROCEDURE (VIEWPOINT): ICD-10-PCS | Mod: S$GLB,,, | Performed by: OBSTETRICS & GYNECOLOGY

## 2023-11-28 PROCEDURE — 76830 TRANSVAGINAL US NON-OB: CPT | Mod: S$GLB,,, | Performed by: OBSTETRICS & GYNECOLOGY

## 2023-12-04 ENCOUNTER — OFFICE VISIT (OUTPATIENT)
Dept: OBSTETRICS AND GYNECOLOGY | Facility: CLINIC | Age: 28
End: 2023-12-04
Payer: MEDICAID

## 2023-12-04 VITALS
WEIGHT: 290 LBS | SYSTOLIC BLOOD PRESSURE: 137 MMHG | BODY MASS INDEX: 54.8 KG/M2 | DIASTOLIC BLOOD PRESSURE: 90 MMHG | HEART RATE: 109 BPM

## 2023-12-04 DIAGNOSIS — N83.201 CYST OF RIGHT OVARY: Primary | ICD-10-CM

## 2023-12-04 DIAGNOSIS — N92.1 MENORRHAGIA WITH IRREGULAR CYCLE: ICD-10-CM

## 2023-12-04 PROCEDURE — 3008F PR BODY MASS INDEX (BMI) DOCUMENTED: ICD-10-PCS | Mod: CPTII,S$GLB,, | Performed by: OBSTETRICS & GYNECOLOGY

## 2023-12-04 PROCEDURE — 3080F PR MOST RECENT DIASTOLIC BLOOD PRESSURE >= 90 MM HG: ICD-10-PCS | Mod: CPTII,S$GLB,, | Performed by: OBSTETRICS & GYNECOLOGY

## 2023-12-04 PROCEDURE — 99213 PR OFFICE/OUTPT VISIT, EST, LEVL III, 20-29 MIN: ICD-10-PCS | Mod: S$GLB,,, | Performed by: OBSTETRICS & GYNECOLOGY

## 2023-12-04 PROCEDURE — 3080F DIAST BP >= 90 MM HG: CPT | Mod: CPTII,S$GLB,, | Performed by: OBSTETRICS & GYNECOLOGY

## 2023-12-04 PROCEDURE — 3008F BODY MASS INDEX DOCD: CPT | Mod: CPTII,S$GLB,, | Performed by: OBSTETRICS & GYNECOLOGY

## 2023-12-04 PROCEDURE — 99213 OFFICE O/P EST LOW 20 MIN: CPT | Mod: S$GLB,,, | Performed by: OBSTETRICS & GYNECOLOGY

## 2023-12-04 PROCEDURE — 3075F SYST BP GE 130 - 139MM HG: CPT | Mod: CPTII,S$GLB,, | Performed by: OBSTETRICS & GYNECOLOGY

## 2023-12-04 PROCEDURE — 3075F PR MOST RECENT SYSTOLIC BLOOD PRESS GE 130-139MM HG: ICD-10-PCS | Mod: CPTII,S$GLB,, | Performed by: OBSTETRICS & GYNECOLOGY

## 2023-12-04 PROCEDURE — 1159F PR MEDICATION LIST DOCUMENTED IN MEDICAL RECORD: ICD-10-PCS | Mod: CPTII,S$GLB,, | Performed by: OBSTETRICS & GYNECOLOGY

## 2023-12-04 PROCEDURE — 1159F MED LIST DOCD IN RCRD: CPT | Mod: CPTII,S$GLB,, | Performed by: OBSTETRICS & GYNECOLOGY

## 2023-12-04 RX ORDER — PROGESTERONE 200 MG/1
200 CAPSULE ORAL EVERY 12 HOURS
Qty: 30 CAPSULE | Refills: 0 | Status: SHIPPED | OUTPATIENT
Start: 2023-12-04 | End: 2024-01-03

## 2023-12-04 NOTE — PROGRESS NOTES
Subjective:       Patient ID: Robe Lopez is a 27 y.o. female.    Chief Complaint:  Pelvic Pain      History of Present Illness  She is here to evaluate  bleeding that begin a month ago   she has a 5 cm cyst and she has an endometrial stripe of 19 mm    she is not looking to achieve pregnancy now.  She did a recent pregnancy test that was negative   she is having pelvic  pain on the right side and into her back at times       GYN & OB History  No LMP recorded.     Date of Last Pap: No result found    OB History    Para Term  AB Living   1 0       0   SAB IAB Ectopic Multiple Live Births                  # Outcome Date GA Lbr Kiel/2nd Weight Sex Delivery Anes PTL Lv   1                 Review of Systems  Review of Systems   Genitourinary:  Positive for menorrhagia.             Objective:    Physical Exam:   Constitutional: She appears well-developed.       Cardiovascular:  Normal rate.                  Abdominal: Soft.     Genitourinary:    Vagina, uterus and left adnexa normal.      Pelvic exam was performed with patient supine.   Cervix is normal. Right adnexum displays fullness.    Genitourinary Comments: Exam is difficult due to body habitus                           Assessment:        1. Cyst of right ovary    2. Menorrhagia with irregular cycle                 Plan:             She has had a upt   wlll send to the lab for CBC TSH free T4 and UPT.    Will proceed with a laparoscopy and ovarian cystectomy and hysteroscopy with myosure     will start progesterone to see if I can impact the menorrhagia  Pt is aware we call all results. If she does not hear from our office regarding her result within a week of having a study or procedure performed she is to call the office so that we can research the result for her.  Discussed follow up.  She is to rtn if her symptoms do not resolve or if persist  Chaperone was present     Answers submitted by the patient for this visit:  Vaginal Bleeding  Questionnaire  (Submitted on 2023)  Chief Complaint: Vaginal bleeding  Chronicity: chronic  Onset: 1 to 4 weeks ago  Frequency: constantly  Progression since onset: gradually worsening  Pain severity: moderate  Affected side: right  Pregnant now?: No  abdominal pain: Yes  anorexia: No  back pain: Yes  chills: No  constipation: No  diarrhea: No  discolored urine: No  dysuria: No  fever: No  flank pain: Yes  frequency: No  headaches: No  hematuria: No  nausea: No  painful intercourse: No  rash: No  urgency: No  vomiting: No  Vaginal bleeding: heavier than menses  Passing clots?: Yes  Passing tissue?: No  Aggravated by: nothing  treatments tried: NSAIDs  Improvement on treatment: no relief  Sexual activity: sexually active  Partner with STD symptoms: no  Menstrual history: irregular  STD: Yes  abdominal surgery: No   section: Yes  Ectopic pregnancy: No  Endometriosis: Yes  herpes simplex: No  gynecological surgery: Yes  menorrhagia: Yes  metrorrhagia: No  miscarriage: No  ovarian cysts: Yes  perineal abscess: No  PID: No  terminated pregnancy: No  vaginosis: No

## 2023-12-06 LAB
B-HCG UR QL: NEGATIVE
T4, FREE: 1.11 NG/DL (ref 0.93–1.7)
TSH SERPL DL<=0.005 MIU/L-ACNC: 9.82 UIU/ML (ref 0.27–4.2)

## 2023-12-07 DIAGNOSIS — E03.9 HYPOTHYROIDISM, UNSPECIFIED TYPE: Primary | ICD-10-CM

## 2023-12-07 RX ORDER — LEVOTHYROXINE SODIUM 75 UG/1
75 TABLET ORAL
Qty: 90 TABLET | Refills: 3 | Status: SHIPPED | OUTPATIENT
Start: 2023-12-07 | End: 2024-03-28 | Stop reason: SDUPTHER

## 2023-12-07 NOTE — TELEPHONE ENCOUNTER
----- Message from Ramon Rust III, MD sent at 12/6/2023  5:55 PM CST -----  Please call  she needs synthroid 75 mcg q am and recheck labs in 4 weeks before a refill

## 2023-12-08 DIAGNOSIS — Z01.818 PRE-OP EVALUATION: Primary | ICD-10-CM

## 2023-12-11 ENCOUNTER — OFFICE VISIT (OUTPATIENT)
Dept: OBSTETRICS AND GYNECOLOGY | Facility: CLINIC | Age: 28
End: 2023-12-11
Payer: MEDICAID

## 2023-12-11 ENCOUNTER — PATIENT MESSAGE (OUTPATIENT)
Dept: PRIMARY CARE CLINIC | Facility: CLINIC | Age: 28
End: 2023-12-11
Payer: MEDICAID

## 2023-12-11 DIAGNOSIS — R10.2 PELVIC PAIN: Primary | ICD-10-CM

## 2023-12-11 DIAGNOSIS — N83.201 CYST OF RIGHT OVARY: ICD-10-CM

## 2023-12-11 DIAGNOSIS — R10.2 PELVIC PAIN: ICD-10-CM

## 2023-12-11 DIAGNOSIS — N92.1 MENORRHAGIA WITH IRREGULAR CYCLE: ICD-10-CM

## 2023-12-11 DIAGNOSIS — E03.9 HYPOTHYROIDISM, UNSPECIFIED TYPE: Primary | ICD-10-CM

## 2023-12-11 LAB
APPEARANCE, UA: CLEAR
B-HCG UR QL: NEGATIVE
BACTERIA SPEC CULT: ABNORMAL /HPF
BASOPHILS NFR BLD: 0.2 % (ref 0–3)
BILIRUB UR QL STRIP: NEGATIVE MG/DL
COLOR UR: ABNORMAL
EOSINOPHIL NFR BLD: 1.2 % (ref 1–3)
ERYTHROCYTE [DISTWIDTH] IN BLOOD BY AUTOMATED COUNT: 17.2 % (ref 12.5–18)
GLUCOSE (UA): NORMAL MG/DL
HCT VFR BLD AUTO: 22.7 % (ref 37–47)
HGB BLD-MCNC: 6.6 G/DL (ref 12–16)
HGB UR QL STRIP: 50 /UL
HYPOCHROMIA BLD QL SMEAR: NORMAL
KETONES UR QL STRIP: NEGATIVE MG/DL
LEUKOCYTE ESTERASE UR QL STRIP: 100 /UL
LYMPHOCYTES NFR BLD: 31.4 % (ref 25–40)
MCH RBC QN AUTO: 20.9 PG (ref 27–31.2)
MCHC RBC AUTO-ENTMCNC: 29.1 G/DL (ref 31.8–35.4)
MCV RBC AUTO: 71.8 FL (ref 80–97)
MICROCYTES BLD QL SMEAR: NORMAL
MONOCYTES NFR BLD: 5.6 % (ref 1–15)
MUCUS URINE: ABNORMAL /LPF
NEUTROPHILS # BLD AUTO: 6.84 10*3/UL (ref 1.8–7.7)
NEUTROPHILS NFR BLD: 60.9 % (ref 37–80)
NITRITE UR QL STRIP: NEGATIVE
NUCLEATED RED BLOOD CELLS: 0.5 %
PH UR STRIP: 6 PH (ref 5–9)
PLATELETS: 534 10*3/UL (ref 142–424)
PROT UR QL STRIP: 30 MG/DL
RBC # BLD AUTO: 3.16 10*6/UL (ref 4.2–5.4)
RBC #/AREA URNS HPF: ABNORMAL /HPF (ref 0–2)
RPR: NON REACTIVE
SERVICE COMMENT 03: ABNORMAL
SP GR UR STRIP: 1.01 (ref 1–1.03)
SPECIMEN COLLECTION METHOD, URINE: ABNORMAL
SQUAMOUS EPITHELIAL, UA: ABNORMAL /LPF
UROBILINOGEN UR STRIP-ACNC: NORMAL MG/DL
WBC # BLD: 11.2 10*3/UL (ref 4.6–10.2)
WBC #/AREA URNS HPF: ABNORMAL /HPF (ref 0–5)

## 2023-12-11 PROCEDURE — 99213 PR OFFICE/OUTPT VISIT, EST, LEVL III, 20-29 MIN: ICD-10-PCS | Mod: S$GLB,,, | Performed by: OBSTETRICS & GYNECOLOGY

## 2023-12-11 PROCEDURE — 99213 OFFICE O/P EST LOW 20 MIN: CPT | Mod: S$GLB,,, | Performed by: OBSTETRICS & GYNECOLOGY

## 2023-12-11 RX ORDER — PROMETHAZINE HYDROCHLORIDE 12.5 MG/1
12.5 TABLET ORAL EVERY 6 HOURS PRN
Qty: 18 TABLET | Refills: 1 | Status: SHIPPED | OUTPATIENT
Start: 2023-12-11 | End: 2024-01-03

## 2023-12-11 RX ORDER — IBUPROFEN 600 MG/1
600 TABLET ORAL 3 TIMES DAILY
Qty: 18 TABLET | Refills: 1 | Status: SHIPPED | OUTPATIENT
Start: 2023-12-11 | End: 2024-01-03

## 2023-12-11 RX ORDER — OXYCODONE AND ACETAMINOPHEN 5; 325 MG/1; MG/1
1 TABLET ORAL EVERY 4 HOURS PRN
Qty: 18 TABLET | Refills: 0 | Status: SHIPPED | OUTPATIENT
Start: 2023-12-11 | End: 2023-12-18

## 2023-12-11 NOTE — PROGRESS NOTES
Expand All Collapse All     Subjective:         Subjective   Patient ID: Robe Lopez is a 27 y.o. female.     Chief Complaint:  Pelvic Pain        History of Present Illness  She is here to evaluate  bleeding that begin a month ago   she has a 5 cm cyst and she has an endometrial stripe of 19 mm    she is not looking to achieve pregnancy now.  She did a recent pregnancy test that was negative   she is having pelvic  pain on the right side and into her back at times         GYN & OB History  No LMP recorded.      Date of Last Pap: No result found                      OB History    Para Term  AB Living   1 0       0   SAB IAB Ectopic Multiple Live Births                         # Outcome Date GA Lbr Kiel/2nd Weight Sex Delivery Anes PTL Lv   1                            Review of Systems  Review of Systems   Genitourinary:  Positive for menorrhagia.                  Objective:      Objective   Physical Exam:   Constitutional: She appears well-developed.       Cardiovascular:  Normal rate.                   Abdominal: Soft.     Genitourinary:    Vagina, uterus and left adnexa normal.      Pelvic exam was performed with patient supine.   Cervix is normal. Right adnexum displays fullness.    Genitourinary Comments: Exam is difficult due to body habitus                             Assessment:         Assessment   1. Cyst of right ovary    2. Menorrhagia with irregular cycle                      Plan:      Plan            She has had a upt   wlll send to the lab for CBC TSH free T4 and UPT.    Will proceed with a laparoscopy and ovarian cystectomy and hysteroscopy with myosure     will start progesterone to see if I can impact the menorrhagia

## 2023-12-12 NOTE — PROGRESS NOTES
She is aware by me and feeling fatigue.  She is open to a blood transfusion.  Please set up on the day of surgery since she will have an IV.  2 u packed red blood cells.

## 2023-12-13 ENCOUNTER — TELEPHONE (OUTPATIENT)
Dept: OBSTETRICS AND GYNECOLOGY | Facility: CLINIC | Age: 28
End: 2023-12-13
Payer: MEDICAID

## 2023-12-13 ENCOUNTER — OUTSIDE PLACE OF SERVICE (OUTPATIENT)
Dept: OBSTETRICS AND GYNECOLOGY | Facility: CLINIC | Age: 28
End: 2023-12-13
Payer: MEDICAID

## 2023-12-13 LAB — GLUCOSE SERPL-MCNC: 187 MG/DL (ref 70–105)

## 2023-12-13 PROCEDURE — 58558 PR HYSTEROSCOPY,W/ENDO BX: ICD-10-PCS | Mod: 59,,, | Performed by: OBSTETRICS & GYNECOLOGY

## 2023-12-13 PROCEDURE — 58558 HYSTEROSCOPY BIOPSY: CPT | Mod: 59,,, | Performed by: OBSTETRICS & GYNECOLOGY

## 2023-12-13 PROCEDURE — 58662 LAPAROSCOPY EXCISE LESIONS: CPT | Mod: ,,, | Performed by: OBSTETRICS & GYNECOLOGY

## 2023-12-13 PROCEDURE — 58662 PR LAP,FULGURATE/EXCISE LESIONS: ICD-10-PCS | Mod: ,,, | Performed by: OBSTETRICS & GYNECOLOGY

## 2023-12-18 RX ORDER — OXYCODONE AND ACETAMINOPHEN 5; 325 MG/1; MG/1
1 TABLET ORAL EVERY 4 HOURS PRN
Qty: 18 EACH | Refills: 0 | Status: SHIPPED | OUTPATIENT
Start: 2023-12-18 | End: 2023-12-18 | Stop reason: SDUPTHER

## 2023-12-18 RX ORDER — OXYCODONE AND ACETAMINOPHEN 5; 325 MG/1; MG/1
1 TABLET ORAL EVERY 4 HOURS PRN
Qty: 18 EACH | Refills: 0 | Status: SHIPPED | OUTPATIENT
Start: 2023-12-18 | End: 2023-12-28 | Stop reason: SDUPTHER

## 2023-12-20 ENCOUNTER — OFFICE VISIT (OUTPATIENT)
Dept: OBSTETRICS AND GYNECOLOGY | Facility: CLINIC | Age: 28
End: 2023-12-20
Payer: MEDICAID

## 2023-12-20 VITALS
WEIGHT: 287 LBS | SYSTOLIC BLOOD PRESSURE: 92 MMHG | DIASTOLIC BLOOD PRESSURE: 61 MMHG | BODY MASS INDEX: 54.19 KG/M2 | HEART RATE: 107 BPM | HEIGHT: 61 IN

## 2023-12-20 DIAGNOSIS — N80.9 ENDOMETRIOSIS: ICD-10-CM

## 2023-12-20 DIAGNOSIS — N92.1 MENORRHAGIA WITH IRREGULAR CYCLE: Primary | ICD-10-CM

## 2023-12-20 DIAGNOSIS — Z98.890 POST-OPERATIVE STATE: Primary | ICD-10-CM

## 2023-12-20 DIAGNOSIS — R10.2 PELVIC PAIN: ICD-10-CM

## 2023-12-20 DIAGNOSIS — N83.201 CYST OF RIGHT OVARY: ICD-10-CM

## 2023-12-20 PROCEDURE — 99024 PR POST-OP FOLLOW-UP VISIT: ICD-10-PCS | Mod: S$GLB,,, | Performed by: OBSTETRICS & GYNECOLOGY

## 2023-12-20 PROCEDURE — 3078F DIAST BP <80 MM HG: CPT | Mod: CPTII,S$GLB,, | Performed by: OBSTETRICS & GYNECOLOGY

## 2023-12-20 PROCEDURE — 99024 POSTOP FOLLOW-UP VISIT: CPT | Mod: S$GLB,,, | Performed by: OBSTETRICS & GYNECOLOGY

## 2023-12-20 PROCEDURE — 3078F PR MOST RECENT DIASTOLIC BLOOD PRESSURE < 80 MM HG: ICD-10-PCS | Mod: CPTII,S$GLB,, | Performed by: OBSTETRICS & GYNECOLOGY

## 2023-12-20 PROCEDURE — 3074F SYST BP LT 130 MM HG: CPT | Mod: CPTII,S$GLB,, | Performed by: OBSTETRICS & GYNECOLOGY

## 2023-12-20 PROCEDURE — 1159F PR MEDICATION LIST DOCUMENTED IN MEDICAL RECORD: ICD-10-PCS | Mod: CPTII,S$GLB,, | Performed by: OBSTETRICS & GYNECOLOGY

## 2023-12-20 PROCEDURE — 3074F PR MOST RECENT SYSTOLIC BLOOD PRESSURE < 130 MM HG: ICD-10-PCS | Mod: CPTII,S$GLB,, | Performed by: OBSTETRICS & GYNECOLOGY

## 2023-12-20 PROCEDURE — 1159F MED LIST DOCD IN RCRD: CPT | Mod: CPTII,S$GLB,, | Performed by: OBSTETRICS & GYNECOLOGY

## 2023-12-20 RX ORDER — RELUGOLIX, ESTRADIOL HEMIHYDRATE, AND NORETHINDRONE ACETATE 40; 1; .5 MG/1; MG/1; MG/1
1 TABLET, FILM COATED ORAL DAILY
Qty: 28 TABLET | Refills: 5 | Status: SHIPPED | OUTPATIENT
Start: 2023-12-20 | End: 2024-12-19

## 2023-12-20 NOTE — LETTER
Dr. Ramon Rust III   [address] [facility], [address]   [place] [address]  Phone: [phone]  Fax: [phone]      Date: 12/20/2023    Patient: Robe Lopez  YOB: 1995      To Whom it May Concern:     Robe Lopez has been under my care from 12/13/23 to 12/22/23. She may return to work/school on 12/23/23. If you have any questions or concerns, please do not hesitate to contact me.      Sincerely,       Ramon Rust III, MD

## 2023-12-20 NOTE — TELEPHONE ENCOUNTER
Ov today. Needs myfembree sent to pharm. Spoke to patient about need for prior auth and she will contact me if [facility] not [facility].

## 2023-12-22 NOTE — PROGRESS NOTES
Subjective:       Patient ID: Robe Lopez is a 28 y.o. female.    Chief Complaint:  Post-op Evaluation      History of Present Illness  She is doing well.  No new health issues,  No abnormal bleeding, No GI or Gu concerns,  No dyspareunia  Status post .laparoscopy  doing very well  discussed her endometrioma   discussed she should consider a med as she is not interested in pregnancy at this time       GYN & OB History  No LMP recorded.     Date of Last Pap: No result found    OB History    Para Term  AB Living   1 0       0   SAB IAB Ectopic Multiple Live Births                  # Outcome Date GA Lbr Kiel/2nd Weight Sex Delivery Anes PTL Lv   1                 Review of Systems  Review of Systems          Objective:    Physical Exam       Assessment:        1. Post-operative state    2. Endometriosis                 Plan:           Myfimbre   reevaluate in 3 months   needs barrier methods of birth control   Pt is aware we call all results. If she does not hear from our office regarding her result within a week of having a study or procedure performed she is to call the office so that we can research the result for her.  Normal activities discussed.   She is to return for any reason.      Chaperone was present

## 2023-12-25 ENCOUNTER — PATIENT MESSAGE (OUTPATIENT)
Dept: OBSTETRICS AND GYNECOLOGY | Facility: CLINIC | Age: 28
End: 2023-12-25
Payer: MEDICAID

## 2023-12-28 DIAGNOSIS — N83.201 CYST OF RIGHT OVARY: ICD-10-CM

## 2023-12-28 DIAGNOSIS — R10.2 PELVIC PAIN: ICD-10-CM

## 2023-12-28 RX ORDER — OXYCODONE AND ACETAMINOPHEN 5; 325 MG/1; MG/1
1 TABLET ORAL EVERY 4 HOURS PRN
Qty: 12 TABLET | Refills: 0 | Status: SHIPPED | OUTPATIENT
Start: 2023-12-28 | End: 2024-01-03

## 2023-12-28 RX ORDER — OXYCODONE AND ACETAMINOPHEN 5; 325 MG/1; MG/1
1 TABLET ORAL EVERY 4 HOURS PRN
Qty: 12 EACH | Refills: 0 | Status: SHIPPED | OUTPATIENT
Start: 2023-12-28 | End: 2023-12-28

## 2023-12-28 RX ORDER — OXYCODONE AND ACETAMINOPHEN 5; 325 MG/1; MG/1
1 TABLET ORAL EVERY 4 HOURS PRN
Qty: 18 EACH | Refills: 0 | Status: SHIPPED | OUTPATIENT
Start: 2023-12-28 | End: 2023-12-28 | Stop reason: CLARIF

## 2023-12-28 NOTE — TELEPHONE ENCOUNTER
Pharmacy unable to fill prescription as it cannot be sent over by fax.   Patient request refill. Allergies reviewed. Pharmacy up to date. Medication pending. Please approve.

## 2023-12-28 NOTE — TELEPHONE ENCOUNTER
----- Message from Marisa Gonzalez sent at 12/28/2023  9:51 AM CST -----  Contact: regan  oxyCODONE-acetaminophen (PERCOCET) 5-325 mg per tablet  Medication can't be faxed must be escribed or hand written    [facility] [address]  Phone: [phone] Fax: [phone]

## 2024-01-02 DIAGNOSIS — Z98.890 POST-OPERATIVE STATE: Primary | ICD-10-CM

## 2024-01-03 ENCOUNTER — OFFICE VISIT (OUTPATIENT)
Dept: OBSTETRICS AND GYNECOLOGY | Facility: CLINIC | Age: 29
End: 2024-01-03
Payer: MEDICAID

## 2024-01-03 ENCOUNTER — PROCEDURE VISIT (OUTPATIENT)
Dept: OBSTETRICS AND GYNECOLOGY | Facility: CLINIC | Age: 29
End: 2024-01-03
Payer: MEDICAID

## 2024-01-03 VITALS
DIASTOLIC BLOOD PRESSURE: 103 MMHG | WEIGHT: 288 LBS | SYSTOLIC BLOOD PRESSURE: 149 MMHG | BODY MASS INDEX: 54.42 KG/M2 | HEART RATE: 118 BPM

## 2024-01-03 DIAGNOSIS — R10.2 PELVIC PAIN: ICD-10-CM

## 2024-01-03 DIAGNOSIS — Z98.890 POST-OPERATIVE STATE: Primary | ICD-10-CM

## 2024-01-03 DIAGNOSIS — N92.1 MENORRHAGIA WITH IRREGULAR CYCLE: ICD-10-CM

## 2024-01-03 DIAGNOSIS — Z98.890 POST-OPERATIVE STATE: ICD-10-CM

## 2024-01-03 PROCEDURE — 99024 POSTOP FOLLOW-UP VISIT: CPT | Mod: S$GLB,,, | Performed by: OBSTETRICS & GYNECOLOGY

## 2024-01-03 PROCEDURE — 1159F MED LIST DOCD IN RCRD: CPT | Mod: CPTII,S$GLB,, | Performed by: OBSTETRICS & GYNECOLOGY

## 2024-01-03 PROCEDURE — 3077F SYST BP >= 140 MM HG: CPT | Mod: CPTII,S$GLB,, | Performed by: OBSTETRICS & GYNECOLOGY

## 2024-01-03 PROCEDURE — 76830 TRANSVAGINAL US NON-OB: CPT | Mod: S$GLB,,, | Performed by: OBSTETRICS & GYNECOLOGY

## 2024-01-03 PROCEDURE — 3080F DIAST BP >= 90 MM HG: CPT | Mod: CPTII,S$GLB,, | Performed by: OBSTETRICS & GYNECOLOGY

## 2024-01-03 RX ORDER — HYDROCODONE BITARTRATE AND ACETAMINOPHEN 5; 325 MG/1; MG/1
1 TABLET ORAL EVERY 6 HOURS PRN
Qty: 18 TABLET | Refills: 0 | Status: SHIPPED | OUTPATIENT
Start: 2024-01-03

## 2024-01-03 RX ORDER — IBUPROFEN 600 MG/1
600 TABLET ORAL 3 TIMES DAILY
Qty: 18 TABLET | Refills: 1 | Status: SHIPPED | OUTPATIENT
Start: 2024-01-03

## 2024-01-03 RX ORDER — AMOXICILLIN AND CLAVULANATE POTASSIUM 875; 125 MG/1; MG/1
1 TABLET, FILM COATED ORAL 2 TIMES DAILY
Qty: 14 TABLET | Refills: 0 | Status: SHIPPED | OUTPATIENT
Start: 2024-01-03 | End: 2024-01-10

## 2024-01-03 RX ORDER — FLUCONAZOLE 150 MG/1
150 TABLET ORAL DAILY
Qty: 2 TABLET | Refills: 2 | Status: SHIPPED | OUTPATIENT
Start: 2024-01-03 | End: 2024-01-09

## 2024-01-03 RX ORDER — SEMAGLUTIDE 2.68 MG/ML
INJECTION, SOLUTION SUBCUTANEOUS
COMMUNITY
Start: 2023-12-22

## 2024-01-03 NOTE — PROGRESS NOTES
Subjective:       Patient ID: Robe Lopez is a 28 y.o. female.    Chief Complaint:  Post-op Evaluation (4 WEEKS POST OP. PATIENT C/O A LOT OF BLEEDING AND PAIN. SHE STATES SHE HAS PAIN EVEN WITH BOWEL MOVEMENTS. )      History of Present Illness  She is here to evaluate   she is on the myfimbre.  She is taking it daily.  She has no dysuria but she has some pain when she has a BM  like pulling.    Her pain is such that she feels she would do a hysterectomy   she had an u/s today that is normal. The endometrial stripe is 4.3 mm     She is having daily BM           GYN & OB History  No LMP recorded (lmp unknown).     Date of Last Pap: No result found    OB History    Para Term  AB Living   1 0       0   SAB IAB Ectopic Multiple Live Births                  # Outcome Date GA Lbr Kiel/2nd Weight Sex Delivery Anes PTL Lv   1                 Review of Systems  Review of Systems   Constitutional:  Negative for activity change.   Eyes:  Negative for visual disturbance.   Respiratory:  Negative for shortness of breath.    Cardiovascular:  Negative for chest pain.   Gastrointestinal:  Negative for abdominal pain.   Genitourinary:  Positive for pelvic pain. Negative for vaginal bleeding.        No abnormal vaginal bleeding   Musculoskeletal:  Negative for back pain.   Integumentary:  Negative for rash and breast mass.   Neurological:  Negative for numbness.   Psychiatric/Behavioral:          No mood disturbance or changes    Breast: Negative for mass            Objective:    Physical Exam:   Constitutional: She appears well-developed.             Abdominal: Soft. There is no rebound and no guarding.     Genitourinary:    Vagina normal.      Pelvic exam was performed with patient supine.   Cervix is normal. Uterus is not tender (and no CMT). Bladder findings: bladder tenderness (very tender)                         Assessment:        1. Post-operative state    2. Menorrhagia with irregular cycle    3.  Pelvic pain                 Plan:           If she continues to hurt will do a CT and she is potentially interested in a TLH    will consider   Pap discussed will return for her annual     Pt is aware we call all results. If she does not hear from our office regarding her result within a week of having a study or procedure performed she is to call the office so that we can research the result for her.  Discussed follow up.  She is to rtn if her symptoms do not resolve or if persist  Chaperone was present

## 2024-01-04 LAB
T4, FREE: 1.19 NG/DL (ref 0.93–1.7)
TSH SERPL DL<=0.005 MIU/L-ACNC: 8.23 UIU/ML (ref 0.27–4.2)

## 2024-01-06 LAB
ALBUMIN SERPL-MCNC: 4.3 G/DL (ref 3.5–5.2)
ALBUMIN/GLOB SERPL ELPH: 1.2 {RATIO} (ref 1–2.7)
ALP ISOS SERPL LEV INH-CCNC: 91 U/L (ref 35–105)
ALT (SGPT): 15 U/L (ref 0–33)
AMORPH URATE CRY URNS QL MICRO: NEGATIVE
ANION GAP SERPL CALC-SCNC: 11 MMOL/L (ref 8–17)
AST SERPL-CCNC: 11 U/L (ref 0–32)
BACTERIA #/AREA URNS HPF: ABNORMAL /[HPF]
BILIRUB UR QL STRIP: NEGATIVE
BILIRUBIN, TOTAL: 0.18 MG/DL (ref 0–1.2)
BUN/CREAT SERPL: 10.3 (ref 6–20)
CALCIUM SERPL-MCNC: 8.8 MG/DL (ref 8.6–10.2)
CARBON DIOXIDE, CO2: 27 MMOL/L (ref 22–29)
CHLORIDE: 99 MMOL/L (ref 98–107)
CLARITY UR: ABNORMAL
COLOR UR: YELLOW
CREAT SERPL-MCNC: 0.59 MG/DL (ref 0.5–0.9)
CULTURE: NORMAL
EPITHELIAL CELLS: ABNORMAL
GFR ESTIMATION: 125.04 ML/MIN/1.73M2
GLOBULIN: 3.6 G/DL (ref 1.5–4.5)
GLUCOSE (UA): 50 MG/DL
GLUCOSE: 261 MG/DL (ref 74–106)
KETONES UR QL STRIP: ABNORMAL MG/DL
LEUKOCYTE ESTERASE UR QL STRIP: ABNORMAL
MUCOUS THREADS URNS QL MICRO: NEGATIVE
NITRITE UR QL STRIP: NEGATIVE
OCCULT BLOOD: ABNORMAL
PH, URINE: 6 (ref 5–7.5)
POTASSIUM: 3.7 MMOL/L (ref 3.5–5.1)
PROT SNV-MCNC: 7.9 G/DL (ref 6.4–8.3)
PROT UR QL STRIP: 150 MG/DL
RBC/HPF: ABNORMAL
SODIUM: 137 MMOL/L (ref 136–145)
SP GR UR STRIP: 1.01 (ref 1–1.03)
UREA NITROGEN (BUN): 6.1 MG/DL (ref 6–20)
URINE CULTURE, ROUTINE: NORMAL
UROBILINOGEN, URINE: NORMAL E.U./DL (ref 0–1)
WBC/HPF: ABNORMAL

## 2024-01-08 ENCOUNTER — TELEPHONE (OUTPATIENT)
Dept: OBSTETRICS AND GYNECOLOGY | Facility: CLINIC | Age: 29
End: 2024-01-08
Payer: MEDICAID

## 2024-01-08 NOTE — TELEPHONE ENCOUNTER
Her bleeding has stopped and her pain is improved.  ' She is doing well now  she feels much better

## 2024-02-19 ENCOUNTER — PATIENT MESSAGE (OUTPATIENT)
Dept: OBSTETRICS AND GYNECOLOGY | Facility: CLINIC | Age: 29
End: 2024-02-19
Payer: MEDICAID

## 2024-02-19 DIAGNOSIS — N76.0 ACUTE VAGINITIS: ICD-10-CM

## 2024-02-19 RX ORDER — FLUCONAZOLE 150 MG/1
150 TABLET ORAL
Qty: 3 TABLET | Refills: 0 | Status: SHIPPED | OUTPATIENT
Start: 2024-02-19 | End: 2024-04-10 | Stop reason: SDUPTHER

## 2024-03-20 ENCOUNTER — PATIENT MESSAGE (OUTPATIENT)
Dept: PRIMARY CARE CLINIC | Facility: CLINIC | Age: 29
End: 2024-03-20
Payer: MEDICAID

## 2024-03-28 DIAGNOSIS — E03.9 HYPOTHYROIDISM, UNSPECIFIED TYPE: ICD-10-CM

## 2024-03-28 RX ORDER — LEVOTHYROXINE SODIUM 75 UG/1
75 TABLET ORAL
Qty: 90 TABLET | Refills: 3 | Status: SHIPPED | OUTPATIENT
Start: 2024-03-28 | End: 2025-03-28

## 2024-04-04 ENCOUNTER — PATIENT MESSAGE (OUTPATIENT)
Dept: PRIMARY CARE CLINIC | Facility: CLINIC | Age: 29
End: 2024-04-04
Payer: MEDICAID

## 2024-04-04 NOTE — TELEPHONE ENCOUNTER
"Pt sent message via [employer]:    "Hi I was wondering if Dr. Garcia received my test results to be sent to the surgeon. "  "

## 2024-04-10 ENCOUNTER — PATIENT MESSAGE (OUTPATIENT)
Dept: OBSTETRICS AND GYNECOLOGY | Facility: CLINIC | Age: 29
End: 2024-04-10
Payer: MEDICAID

## 2024-04-10 ENCOUNTER — PATIENT MESSAGE (OUTPATIENT)
Dept: PRIMARY CARE CLINIC | Facility: CLINIC | Age: 29
End: 2024-04-10
Payer: MEDICAID

## 2024-04-10 DIAGNOSIS — N76.0 ACUTE VAGINITIS: ICD-10-CM

## 2024-04-10 RX ORDER — FLUCONAZOLE 150 MG/1
150 TABLET ORAL
Qty: 3 TABLET | Refills: 0 | Status: SHIPPED | OUTPATIENT
Start: 2024-04-10

## 2024-04-10 NOTE — TELEPHONE ENCOUNTER
Robe Garcia Staff (supporting Radha Garcia MD)31 minutes ago (2:29 PM)       Hi I need a refill on my metformin if thats possible

## 2024-04-11 RX ORDER — METFORMIN HYDROCHLORIDE 500 MG/1
500 TABLET ORAL 2 TIMES DAILY
Qty: 180 TABLET | Refills: 3 | Status: SHIPPED | OUTPATIENT
Start: 2024-04-11

## 2024-07-23 ENCOUNTER — CLINICAL SUPPORT (OUTPATIENT)
Dept: OBSTETRICS AND GYNECOLOGY | Facility: CLINIC | Age: 29
End: 2024-07-23
Payer: MEDICAID

## 2024-07-23 ENCOUNTER — OFFICE VISIT (OUTPATIENT)
Dept: PRIMARY CARE CLINIC | Facility: CLINIC | Age: 29
End: 2024-07-23
Payer: MEDICAID

## 2024-07-23 VITALS
HEART RATE: 100 BPM | DIASTOLIC BLOOD PRESSURE: 86 MMHG | OXYGEN SATURATION: 98 % | SYSTOLIC BLOOD PRESSURE: 139 MMHG | HEIGHT: 61 IN | WEIGHT: 264.81 LBS | BODY MASS INDEX: 50 KG/M2

## 2024-07-23 DIAGNOSIS — Z01.89 ROUTINE LAB DRAW: Primary | ICD-10-CM

## 2024-07-23 DIAGNOSIS — Z98.84 S/P BARIATRIC SURGERY: ICD-10-CM

## 2024-07-23 DIAGNOSIS — E05.90 HYPERTHYROIDISM: ICD-10-CM

## 2024-07-23 DIAGNOSIS — E11.9 TYPE 2 DIABETES MELLITUS WITHOUT COMPLICATION, WITHOUT LONG-TERM CURRENT USE OF INSULIN: Primary | ICD-10-CM

## 2024-07-23 LAB
% SATURATION: 7 % (ref 20–50)
ABS NRBC COUNT: 0 THOU/UL (ref 0–0.01)
ABSOLUTE BASOPHIL: 0 10*3/UL (ref 0–0.3)
ABSOLUTE EOSINOPHIL: 0.2 10*3/UL (ref 0–0.6)
ABSOLUTE IMMATURE GRAN: 0.03 THOU/UL (ref 0–0.03)
ABSOLUTE LYMPHOCYTE: 3.1 10*3/UL (ref 1.2–4)
ABSOLUTE MONOCYTE: 0.6 10*3/UL (ref 0.1–0.8)
ALBUMIN SERPL BCP-MCNC: 3.4 G/DL (ref 3.4–5)
ALP SERPL-CCNC: 97 U/L (ref 45–117)
ALT SERPL W P-5'-P-CCNC: 18 U/L (ref 13–56)
ANION GAP SERPL CALC-SCNC: 5 MMOL/L (ref 3–11)
ANISOCYTOSIS: NORMAL
AST SERPL-CCNC: 12 U/L (ref 15–37)
BASOPHILS NFR BLD: 0.2 % (ref 0–3)
BILIRUB SERPL-MCNC: 0.3 MG/DL (ref 0.2–1)
BUN SERPL-MCNC: 7 MG/DL (ref 7–18)
BUN/CREAT SERPL: 10 RATIO
CALCIUM SERPL-MCNC: 8.8 MG/DL (ref 8.5–10.1)
CHLORIDE SERPL-SCNC: 105 MMOL/L (ref 98–107)
CO2 SERPL-SCNC: 28 MMOL/L (ref 21–32)
CREAT SERPL-MCNC: 0.7 MG/DL (ref 0.55–1.02)
EOSINOPHIL NFR BLD: 1.6 % (ref 0–6)
ERYTHROCYTE [DISTWIDTH] IN BLOOD BY AUTOMATED COUNT: 20 % (ref 0–15.5)
FERRITIN SERPL-MCNC: 15.6 NG/ML (ref 8–252)
FOLATE: 13.4 NG/ML (ref 3.1–17.5)
GFR ESTIMATION: > 60
GLUCOSE SERPL-MCNC: 164 MG/DL (ref 74–106)
HBA1C MFR BLD: 8 % (ref 4–6)
HCT VFR BLD AUTO: 32.8 % (ref 37–47)
HGB BLD-MCNC: 9.8 G/DL (ref 12–16)
IMMATURE GRANULOCYTES: 0.3 % (ref 0–0.43)
IRON: 22 UG/DL (ref 50–170)
LYMPHOCYTES NFR BLD: 32.9 % (ref 20–45)
MCH RBC QN AUTO: 22.5 PG (ref 27–32)
MCHC RBC AUTO-ENTMCNC: 29.9 % (ref 32–36)
MCV RBC AUTO: 75.2 FL (ref 80–99)
MICROCYTES BLD QL SMEAR: NORMAL
MONOCYTES NFR BLD: 6.2 % (ref 2–10)
NEUTROPHILS # BLD AUTO: 5.5 10*3/UL (ref 1.4–7)
NEUTROPHILS NFR BLD: 58.8 % (ref 50–80)
NUCLEATED RED BLOOD CELLS: 0 % (ref 0–0.2)
PHOSPHATE FLD-MCNC: 2.4 MG/DL (ref 2.5–4.9)
PLATELET MORPHOLOGY: NORMAL
PLATELETS: 619 10*3/UL (ref 130–400)
PMV BLD AUTO: 9.6 FL (ref 9.2–12.2)
POTASSIUM SERPL-SCNC: 4 MMOL/L (ref 3.5–5.1)
PROT SERPL-MCNC: 7.2 G/DL (ref 6.4–8.2)
RBC # BLD AUTO: 4.36 10*6/UL (ref 4.2–5.4)
SMALL PLATELETS BLD QL SMEAR: NORMAL
SODIUM BLD-SCNC: 138 MMOL/L (ref 131–143)
T4 FREE SP9 P CHAL SERPL-SCNC: 1.09 NG/DL (ref 0.76–1.46)
TOTAL IRON BINDING CAPACITY: 325 UG/DL (ref 250–450)
TSH SERPL DL<=0.005 MIU/L-ACNC: 4.56 UIU/ML (ref 0.36–3.74)
VITAMIN B12: 325 PG/ML (ref 193–986)
WBC # BLD: 9.4 10*3/UL (ref 4.5–10)

## 2024-07-23 NOTE — PROGRESS NOTES
Subjective:      Patient ID: Robe Lopez is a 28 y.o. female.    Chief Complaint: Follow-up (Gastric sleeve 05/22/24. )    HPI    Past Medical History:   Diagnosis Date    Asthma     Diabetes mellitus     Endometriosis     Gonorrhea 2015    TREATED WITH ANTIBIOTICS     HPV (human papilloma virus) anogenital infection 2015    REPEAT PAPS NORMAL    Hypertension     PREEXISTING   SHE IS NOT TAKING ANY MEDS    Hyperthyroidism     Infertility, female     Obesity     Rapid heart beat     ADMITTED TO [facility]  EKG NORMAL, FOLLOW UP WITH MF SCHEDULED 02/04/2020    Tachycardia     Thyroid disease     POORLY CONTROLLED APPT WITH [facility] 06/04/2020         Patient with above medical problems here for follow up after bariatric surgery  Diabetic with A1c of 8.0 today in clinic, she states she was told by the surgeon to stop her metformin      Review of Systems   Constitutional:  Negative for chills and fever.   HENT:  Negative for hearing loss.    Eyes:  Negative for blurred vision.   Respiratory:  Negative for cough, shortness of breath and wheezing.    Cardiovascular:  Negative for chest pain, palpitations and leg swelling.   Gastrointestinal:  Negative for abdominal pain, blood in stool, constipation, diarrhea, melena, nausea and vomiting.   Genitourinary:  Negative for dysuria, frequency and urgency.   Musculoskeletal:  Negative for falls.   Skin:  Negative for rash.   Neurological:  Negative for dizziness and headaches.   Endo/Heme/Allergies:  Does not bruise/bleed easily.   Psychiatric/Behavioral:  Negative for depression. The patient is not nervous/anxious.      Objective:     Physical Exam  Vitals reviewed.   Constitutional:       Appearance: Normal appearance.   HENT:      Head: Normocephalic.      Mouth/Throat:      Mouth: Mucous membranes are moist.      Pharynx: Oropharynx is clear.   Eyes:      Extraocular Movements: Extraocular movements intact.      Conjunctiva/sclera: Conjunctivae normal.      Pupils: Pupils are  "equal, round, and reactive to light.   Cardiovascular:      Rate and Rhythm: Normal rate and regular rhythm.   Pulmonary:      Effort: Pulmonary effort is normal.      Breath sounds: Normal breath sounds.   Abdominal:      General: Bowel sounds are normal.   Musculoskeletal:      Right lower leg: No edema.      Left lower leg: No edema.   Skin:     General: Skin is warm.      Capillary Refill: Capillary refill takes less than 2 seconds.   Neurological:      General: No focal deficit present.      Mental Status: She is alert and oriented to person, place, and time.   Psychiatric:         Mood and Affect: Mood normal.       /86 (BP Location: Right forearm, Patient Position: Sitting, BP Method: Large (Automatic))   Pulse 100   Ht 5' 1" (1.549 m)   Wt 120.1 kg (264 lb 12.8 oz)   SpO2 98%   BMI 50.03 kg/m²     Assessment:       ICD-10-CM ICD-9-CM   1. Type 2 diabetes mellitus without complication, without long-term current use of insulin  E11.9 250.00   2. S/P bariatric surgery  Z98.84 V45.86   3. Hyperthyroidism  E05.90 242.90       Plan:     Medication List with Changes/Refills   Current Medications    FLUCONAZOLE (DIFLUCAN) 150 MG TAB    Take 1 tablet (150 mg total) by mouth as needed. for one day    LEVOTHYROXINE (SYNTHROID) 75 MCG TABLET    Take 1 tablet (75 mcg total) by mouth before breakfast.    MYFEMBREE 40-1-0.5 MG TAB    Take 1 tablet by mouth once daily.   Discontinued Medications    HYDROCODONE-ACETAMINOPHEN (NORCO) 5-325 MG PER TABLET    Take 1 tablet by mouth every 6 (six) hours as needed for Pain.    IBUPROFEN (ADVIL,MOTRIN) 600 MG TABLET    Take 1 tablet (600 mg total) by mouth 3 (three) times daily.    METFORMIN (GLUCOPHAGE) 500 MG TABLET    Take 1 tablet (500 mg total) by mouth 2 (two) times daily.    OZEMPIC 2 MG/DOSE (8 MG/3 ML) PNIJ    INJECT 2 MG SUB-Q ONCE A WEEK        1. Type 2 diabetes mellitus without complication, without long-term current use of insulin  -     empagliflozin " (JARDIANCE) 10 mg tablet; Take 1 tablet (10 mg total) by mouth once. for 1 dose  Dispense: 30 tablet; Refill: 3    2. S/P bariatric surgery  -     CBC Auto Differential; Future; Expected date: 07/23/2024  -     Comprehensive Metabolic Panel; Future; Expected date: 07/23/2024  -     Iron, TIBC and Ferritin Panel; Future; Expected date: 07/23/2024  -     Vitamin B12; Future; Expected date: 07/23/2024  -     Folate; Future; Expected date: 07/23/2024  -     Phosphorus; Future; Expected date: 07/23/2024    3. Hyperthyroidism  -     TSH; Future; Expected date: 07/23/2024  -     T4, Free; Future; Expected date: 07/23/2024    Other orders  -     Iron and TIBC  -     Ferritin  -     TSH+Free T4  -     Platelet Morphology  -     Morphology       Patient has lost about 20 lbs since the surgery, needs to continue diet and exercise     Future Appointments   Date Time Provider Department Center   10/23/2024 11:40 AM Radha Garcia MD [facility] BRIANA Ying

## 2024-08-09 DIAGNOSIS — D50.9 IRON DEFICIENCY ANEMIA, UNSPECIFIED IRON DEFICIENCY ANEMIA TYPE: ICD-10-CM

## 2024-08-09 DIAGNOSIS — E11.9 TYPE 2 DIABETES MELLITUS WITHOUT COMPLICATION, WITHOUT LONG-TERM CURRENT USE OF INSULIN: Primary | ICD-10-CM

## 2024-08-09 RX ORDER — BACILLUS COAGULANS 1B CELL
1 CAPSULE ORAL DAILY
Qty: 90 TABLET | Refills: 3 | Status: SHIPPED | OUTPATIENT
Start: 2024-08-09

## 2024-08-13 ENCOUNTER — PATIENT MESSAGE (OUTPATIENT)
Dept: PRIMARY CARE CLINIC | Facility: CLINIC | Age: 29
End: 2024-08-13
Payer: MEDICAID

## 2024-12-09 ENCOUNTER — PATIENT MESSAGE (OUTPATIENT)
Dept: OBSTETRICS AND GYNECOLOGY | Facility: CLINIC | Age: 29
End: 2024-12-09
Payer: MEDICAID

## 2025-02-10 ENCOUNTER — PATIENT MESSAGE (OUTPATIENT)
Dept: OBSTETRICS AND GYNECOLOGY | Facility: CLINIC | Age: 30
End: 2025-02-10
Payer: MEDICAID

## 2025-02-10 DIAGNOSIS — B37.31 VAGINAL YEAST INFECTION: Primary | ICD-10-CM

## 2025-02-10 DIAGNOSIS — N76.0 BACTERIAL VAGINOSIS: ICD-10-CM

## 2025-02-10 DIAGNOSIS — B37.31 VAGINAL YEAST INFECTION: ICD-10-CM

## 2025-02-10 DIAGNOSIS — B96.89 BACTERIAL VAGINOSIS: ICD-10-CM

## 2025-02-10 RX ORDER — FLUCONAZOLE 150 MG/1
TABLET ORAL
Qty: 2 TABLET | Refills: 2 | Status: SHIPPED | OUTPATIENT
Start: 2025-02-10

## 2025-02-10 RX ORDER — TINIDAZOLE 500 MG/1
2 TABLET ORAL
Qty: 8 TABLET | Refills: 0 | Status: SHIPPED | OUTPATIENT
Start: 2025-02-10 | End: 2025-02-12

## 2025-02-10 NOTE — TELEPHONE ENCOUNTER
Patient c/o vaginal odor and discharge. She is requesting treatment. Advised we will send something out to treat but she is to let us know if her symptoms worsen or do not improve.   Patient request refill. Allergies reviewed. Pharmacy up to date. Medication pending. Please approve.

## 2025-02-17 RX ORDER — FLUCONAZOLE 150 MG/1
TABLET ORAL
Qty: 2 TABLET | Refills: 2 | Status: SHIPPED | OUTPATIENT
Start: 2025-02-17

## 2025-02-17 RX ORDER — TINIDAZOLE 500 MG/1
2 TABLET ORAL
Qty: 8 TABLET | Refills: 0 | Status: SHIPPED | OUTPATIENT
Start: 2025-02-17 | End: 2025-02-19

## 2025-04-29 DIAGNOSIS — B96.89 BACTERIAL VAGINOSIS: ICD-10-CM

## 2025-04-29 DIAGNOSIS — N76.0 BACTERIAL VAGINOSIS: ICD-10-CM

## 2025-04-29 DIAGNOSIS — B37.31 VAGINAL YEAST INFECTION: ICD-10-CM

## 2025-04-29 RX ORDER — FLUCONAZOLE 150 MG/1
TABLET ORAL
Qty: 2 TABLET | Refills: 2 | OUTPATIENT
Start: 2025-04-29

## 2025-04-29 RX ORDER — FLUCONAZOLE 150 MG/1
TABLET ORAL
Qty: 2 TABLET | Refills: 2 | Status: SHIPPED | OUTPATIENT
Start: 2025-04-29

## 2025-05-19 ENCOUNTER — PATIENT MESSAGE (OUTPATIENT)
Dept: PRIMARY CARE CLINIC | Facility: CLINIC | Age: 30
End: 2025-05-19
Payer: MEDICAID

## 2025-05-21 ENCOUNTER — PATIENT MESSAGE (OUTPATIENT)
Dept: OBSTETRICS AND GYNECOLOGY | Facility: CLINIC | Age: 30
End: 2025-05-21
Payer: MEDICAID

## 2025-05-21 ENCOUNTER — TELEPHONE (OUTPATIENT)
Dept: OBSTETRICS AND GYNECOLOGY | Facility: CLINIC | Age: 30
End: 2025-05-21
Payer: MEDICAID

## 2025-05-21 ENCOUNTER — OFFICE VISIT (OUTPATIENT)
Dept: PRIMARY CARE CLINIC | Facility: CLINIC | Age: 30
End: 2025-05-21
Payer: MEDICAID

## 2025-05-21 VITALS
BODY MASS INDEX: 50.03 KG/M2 | DIASTOLIC BLOOD PRESSURE: 80 MMHG | SYSTOLIC BLOOD PRESSURE: 126 MMHG | OXYGEN SATURATION: 98 % | HEART RATE: 93 BPM | RESPIRATION RATE: 18 BRPM | HEIGHT: 61 IN

## 2025-05-21 DIAGNOSIS — D50.9 IRON DEFICIENCY ANEMIA, UNSPECIFIED IRON DEFICIENCY ANEMIA TYPE: Primary | ICD-10-CM

## 2025-05-21 LAB — HBA1C MFR BLD: 5.8 % (ref 4–6)

## 2025-05-21 PROCEDURE — 99214 OFFICE O/P EST MOD 30 MIN: CPT | Mod: S$PBB,,, | Performed by: INTERNAL MEDICINE

## 2025-05-21 PROCEDURE — 1159F MED LIST DOCD IN RCRD: CPT | Mod: CPTII,,, | Performed by: INTERNAL MEDICINE

## 2025-05-21 PROCEDURE — 3008F BODY MASS INDEX DOCD: CPT | Mod: CPTII,,, | Performed by: INTERNAL MEDICINE

## 2025-05-21 PROCEDURE — 3044F HG A1C LEVEL LT 7.0%: CPT | Mod: CPTII,,, | Performed by: INTERNAL MEDICINE

## 2025-05-21 PROCEDURE — 3074F SYST BP LT 130 MM HG: CPT | Mod: CPTII,,, | Performed by: INTERNAL MEDICINE

## 2025-05-21 PROCEDURE — 3079F DIAST BP 80-89 MM HG: CPT | Mod: CPTII,,, | Performed by: INTERNAL MEDICINE

## 2025-05-21 RX ORDER — FERROUS SULFATE 325(65) MG
325 TABLET ORAL 2 TIMES DAILY
Qty: 180 TABLET | Refills: 3 | Status: SHIPPED | OUTPATIENT
Start: 2025-05-21

## 2025-05-21 NOTE — LETTER
May 21, 2025      [facility]  [address] [place] [address], [address]  [facility] [address]  Phone: [phone]  Fax: [phone]       Patient: Robe Lopez   YOB: 1995  Date of Visit: 05/21/2025    To Whom It May Concern:    Sarah Lopez  was at [facility] on 05/21/2025. The patient may return to work/school on 05/22/25 with restrictions. No climbing or running for 6 weeks. If you have any questions or concerns, or if I can be of further assistance, please do not hesitate to contact me.    Sincerely,    Radha Garcia MD

## 2025-05-21 NOTE — PROGRESS NOTES
Subjective:      Patient ID: Robe Lopez is a 29 y.o. female.    Chief Complaint: Follow-up (ER F/U/Pt passed out and hit her head twice on the counter.), Medication Refill (Pt would like to be prescribed Iron.), and Knee Pain (Pt states she cannot put weight on her knee nor bend it.)    HPI    Past Medical History:   Diagnosis Date    Asthma     Diabetes mellitus     Endometriosis     Gonorrhea 2015    TREATED WITH ANTIBIOTICS     HPV (human papilloma virus) anogenital infection 2015    REPEAT PAPS NORMAL    Hypertension     PREEXISTING   SHE IS NOT TAKING ANY MEDS    Hyperthyroidism     Infertility, female     Obesity     Rapid heart beat     ADMITTED TO [facility]  EKG NORMAL, FOLLOW UP WITH MF SCHEDULED 02/04/2020    Tachycardia     Thyroid disease     POORLY CONTROLLED APPT WITH [facility] 06/04/2020       Patient with above medical problems s/p bariatric surgery here for follow up after admitted for symptomatic anemia    Had an extensive workup in ER including EKG/CT head etc     Patient with h/o abnormal vaginal bleeding, need to rule out cause of anemia        Review of Systems   Constitutional:  Positive for malaise/fatigue. Negative for chills and fever.   HENT:  Negative for hearing loss.    Eyes:  Negative for blurred vision.   Respiratory:  Negative for cough, shortness of breath and wheezing.    Cardiovascular:  Negative for chest pain, palpitations and leg swelling.   Gastrointestinal:  Negative for abdominal pain, blood in stool, constipation, diarrhea, melena, nausea and vomiting.   Genitourinary:  Negative for dysuria, frequency and urgency.   Musculoskeletal:  Negative for falls.   Skin:  Negative for rash.   Neurological:  Positive for dizziness. Negative for headaches.   Endo/Heme/Allergies:  Does not bruise/bleed easily.   Psychiatric/Behavioral:  Negative for depression. The patient is not nervous/anxious.      Objective:     Physical Exam  Vitals reviewed.   Constitutional:       Appearance: Normal  appearance.   HENT:      Head: Normocephalic.      Mouth/Throat:      Mouth: Mucous membranes are moist.      Pharynx: Oropharynx is clear.   Eyes:      Extraocular Movements: Extraocular movements intact.      Conjunctiva/sclera: Conjunctivae normal.      Pupils: Pupils are equal, round, and reactive to light.   Cardiovascular:      Rate and Rhythm: Normal rate and regular rhythm.   Pulmonary:      Effort: Pulmonary effort is normal.      Breath sounds: Normal breath sounds.   Abdominal:      General: Bowel sounds are normal.   Musculoskeletal:      Right lower leg: No edema.      Left lower leg: No edema.   Skin:     General: Skin is warm.      Capillary Refill: Capillary refill takes less than 2 seconds.   Neurological:      Mental Status: She is alert and oriented to person, place, and time.   Psychiatric:         Mood and Affect: Mood normal.       Assessment:       ICD-10-CM ICD-9-CM   1. Iron deficiency anemia, unspecified iron deficiency anemia type  D50.9 280.9       Plan:     Medication List with Changes/Refills   New Medications    FERROUS SULFATE (IRON, FERROUS SULFATE,) 325 MG (65 MG IRON) TAB TABLET    Take 1 tablet (325 mg total) by mouth 2 (two) times daily.   Current Medications    EMPAGLIFLOZIN (JARDIANCE) 25 MG TABLET    Take 1 tablet (25 mg total) by mouth once daily.    FLUCONAZOLE (DIFLUCAN) 150 MG TAB    Take on tablet by mouth every other day for two doses #2 tablets with 2 refills    IRON,CARBONYL-VITAMIN C (VITRON-C) 65 MG IRON- 125 MG TBEC    Take 1 tablet by mouth once daily.    LEVOTHYROXINE (SYNTHROID) 75 MCG TABLET    Take 1 tablet (75 mcg total) by mouth before breakfast.        1. Iron deficiency anemia, unspecified iron deficiency anemia type  -     ferrous sulfate (IRON, FERROUS SULFATE,) 325 mg (65 mg iron) Tab tablet; Take 1 tablet (325 mg total) by mouth 2 (two) times daily.  Dispense: 180 tablet; Refill: 3  -     Ambulatory referral/consult to Obstetrics / Gynecology; Future;  Expected date: 05/28/2025       Referred to gynecology to ensure that anemia is not due to excess uterine bleeding. Source of bleeding needs to be resolved     Return to work letter provided, initially given restrictions but patient states her knee pain is better so restrictions were removed    RTC 4 months or sooner as needed           Future Appointments   Date Time Provider Department Center   7/7/2025 11:00 AM [facility] [address] [facility] BRIANA Ying

## 2025-05-21 NOTE — TELEPHONE ENCOUNTER
Sent pt message through portal to see what's going on. She has a new patient appointment scheduled with Dr. Ortiz in a couple of weeks.

## 2025-05-21 NOTE — TELEPHONE ENCOUNTER
----- Message from Qminder sent at 5/21/2025  2:41 PM CDT -----  Type:  Needs Medical AdviceWho Called: Robe Lopez'Symptoms (please be specific): vaginal bleeding ( possible ficroids) How long has patient had these symptoms:  -Pharmacy name and phone #:  -Would the patient rather a call back or a response via [facility]?  Best Call Back Number: [phone] Information: pt needs to schedule appt please call

## 2025-05-22 ENCOUNTER — PROCEDURE VISIT (OUTPATIENT)
Dept: OBSTETRICS AND GYNECOLOGY | Facility: CLINIC | Age: 30
End: 2025-05-22
Payer: MEDICAID

## 2025-05-22 DIAGNOSIS — R10.2 PELVIC PAIN: Primary | ICD-10-CM

## 2025-05-22 DIAGNOSIS — D21.9 FIBROIDS: ICD-10-CM

## 2025-05-22 DIAGNOSIS — R10.2 PELVIC PAIN: ICD-10-CM

## 2025-05-22 PROCEDURE — 76830 TRANSVAGINAL US NON-OB: CPT | Mod: 26,S$PBB,, | Performed by: OBSTETRICS & GYNECOLOGY

## 2025-05-23 ENCOUNTER — RESULTS FOLLOW-UP (OUTPATIENT)
Dept: OBSTETRICS AND GYNECOLOGY | Facility: CLINIC | Age: 30
End: 2025-05-23

## 2025-05-23 ENCOUNTER — PATIENT MESSAGE (OUTPATIENT)
Dept: PRIMARY CARE CLINIC | Facility: CLINIC | Age: 30
End: 2025-05-23
Payer: MEDICAID

## 2025-07-02 ENCOUNTER — PATIENT MESSAGE (OUTPATIENT)
Dept: OBSTETRICS AND GYNECOLOGY | Facility: CLINIC | Age: 30
End: 2025-07-02
Payer: MEDICAID

## 2025-07-03 DIAGNOSIS — B96.89 BACTERIAL VAGINOSIS: Primary | ICD-10-CM

## 2025-07-03 DIAGNOSIS — N76.0 BACTERIAL VAGINOSIS: Primary | ICD-10-CM

## 2025-07-03 RX ORDER — TINIDAZOLE 500 MG/1
2 TABLET ORAL
Qty: 8 TABLET | Refills: 0 | Status: SHIPPED | OUTPATIENT
Start: 2025-07-03 | End: 2025-07-05

## 2025-07-03 NOTE — TELEPHONE ENCOUNTER
Patient c/o bv and requesting medication. Advised we will treat but she is to let us know if symptoms worsen or do not improve. She verbalized understanding.   Patient request refill. Allergies reviewed. Pharmacy up to date. Medication pending. Please approve.

## 2025-07-15 ENCOUNTER — PATIENT MESSAGE (OUTPATIENT)
Dept: OBSTETRICS AND GYNECOLOGY | Facility: CLINIC | Age: 30
End: 2025-07-15
Payer: MEDICAID